# Patient Record
Sex: FEMALE | Race: WHITE | NOT HISPANIC OR LATINO | Employment: OTHER | URBAN - METROPOLITAN AREA
[De-identification: names, ages, dates, MRNs, and addresses within clinical notes are randomized per-mention and may not be internally consistent; named-entity substitution may affect disease eponyms.]

---

## 2017-07-14 ENCOUNTER — OFFICE VISIT (OUTPATIENT)
Dept: FAMILY MEDICINE CLINIC | Facility: CLINIC | Age: 47
End: 2017-07-14

## 2017-07-14 VITALS
BODY MASS INDEX: 34.2 KG/M2 | SYSTOLIC BLOOD PRESSURE: 120 MMHG | HEIGHT: 63 IN | HEART RATE: 70 BPM | WEIGHT: 193 LBS | DIASTOLIC BLOOD PRESSURE: 90 MMHG | TEMPERATURE: 98 F | OXYGEN SATURATION: 98 %

## 2017-07-14 DIAGNOSIS — F41.9 ANXIETY: ICD-10-CM

## 2017-07-14 DIAGNOSIS — G43.009 MIGRAINE WITHOUT AURA AND WITHOUT STATUS MIGRAINOSUS, NOT INTRACTABLE: ICD-10-CM

## 2017-07-14 DIAGNOSIS — Z00.00 ANNUAL PHYSICAL EXAM: Primary | ICD-10-CM

## 2017-07-14 PROBLEM — R06.81 BREATHLESSNESS ON EXERTION: Status: RESOLVED | Noted: 2017-07-14 | Resolved: 2017-07-14

## 2017-07-14 PROBLEM — J30.9 ALLERGIC RHINITIS: Status: ACTIVE | Noted: 2017-07-14

## 2017-07-14 PROBLEM — M19.90 ARTHRITIS: Status: RESOLVED | Noted: 2017-07-14 | Resolved: 2017-07-14

## 2017-07-14 PROBLEM — M19.90 ARTHRITIS: Status: ACTIVE | Noted: 2017-07-14

## 2017-07-14 PROBLEM — R07.9 CHEST PAIN: Status: RESOLVED | Noted: 2017-07-14 | Resolved: 2017-07-14

## 2017-07-14 PROBLEM — R07.9 CHEST PAIN: Status: ACTIVE | Noted: 2017-07-14

## 2017-07-14 PROBLEM — R06.81 BREATHLESSNESS ON EXERTION: Status: ACTIVE | Noted: 2017-07-14

## 2017-07-14 LAB
BILIRUB BLD-MCNC: NEGATIVE MG/DL
CLARITY, POC: CLEAR
COLOR UR: YELLOW
GLUCOSE UR STRIP-MCNC: NEGATIVE MG/DL
KETONES UR QL: NEGATIVE
LEUKOCYTE EST, POC: NEGATIVE
NITRITE UR-MCNC: NEGATIVE MG/ML
PH UR: 5.5 [PH] (ref 5–8)
PROT UR STRIP-MCNC: NEGATIVE MG/DL
RBC # UR STRIP: ABNORMAL /UL
SP GR UR: 1.03 (ref 1–1.03)
UROBILINOGEN UR QL: NORMAL

## 2017-07-14 PROCEDURE — 81003 URINALYSIS AUTO W/O SCOPE: CPT | Performed by: FAMILY MEDICINE

## 2017-07-14 PROCEDURE — 99396 PREV VISIT EST AGE 40-64: CPT | Performed by: FAMILY MEDICINE

## 2017-07-14 RX ORDER — BUPROPION HYDROCHLORIDE 150 MG/1
150 TABLET, EXTENDED RELEASE ORAL DAILY
Qty: 30 TABLET | Refills: 5 | Status: SHIPPED | OUTPATIENT
Start: 2017-07-14 | End: 2018-10-23

## 2017-07-14 RX ORDER — SUMATRIPTAN 100 MG/1
TABLET, FILM COATED ORAL
Qty: 6 TABLET | Refills: 5 | Status: SHIPPED | OUTPATIENT
Start: 2017-07-14 | End: 2018-07-31 | Stop reason: SINTOL

## 2017-07-14 NOTE — PROGRESS NOTES
Subjective   Janye Peña is a 46 y.o. female presenting with   Chief Complaint   Patient presents with   • Annual Exam   • Anxiety     would like a different kind of medication for anxiety         HPI Comments: This is a 46-year-old white female nonsmoker who has recently gone through a divorce and has moved and so she has been under a lot of stress.  She is here for annual exam and fasting lab, but also mentions that she has had increased anxiety lately and she also has had about 6 migraines in the last 7 months.  She has an oral that warrants her she is about to have one but she does not have any medication for this.  Normally she can take Excedrin Migraine when she feels one coming on, but the last one 2 weeks ago was fully developed when she woke up so she had to just wait for it to subside.  She does not have any stiff neck or confusion or focal neurologic deficit during these migraines.    She also says with the divorce her anxiety level is much higher when she takes the Atarax it causes her to be extremely sedated.  She would like to try a different medication for this.    She does not have any change in bowel movements but she does mention that her grandfather late in life was diagnosed with colon cancer.  Her mother has diabetes and has had a heart attack but does not have any colon problem at 63 years old.  I told her that she should go ahead and get a colonoscopy now because of her positive family history.    Anxiety   Symptoms include nervous/anxious behavior.            The following portions of the patient's history were reviewed and updated as appropriate: current medications, past family history, past medical history, past social history, past surgical history and problem list.    Review of Systems   Neurological: Positive for headaches.   Psychiatric/Behavioral: The patient is nervous/anxious.    All other systems reviewed and are negative.      Objective   Physical Exam   Constitutional: She is  oriented to person, place, and time. She appears well-developed and well-nourished. No distress.   HENT:   Head: Normocephalic and atraumatic.   Right Ear: External ear normal.   Left Ear: External ear normal.   Mouth/Throat: Oropharynx is clear and moist. No oropharyngeal exudate.   Eyes: Conjunctivae and EOM are normal. Pupils are equal, round, and reactive to light. No scleral icterus.   Neck: Normal range of motion. Neck supple. No thyromegaly present.   Cardiovascular: Normal rate, regular rhythm, normal heart sounds and intact distal pulses.  Exam reveals no friction rub.    No murmur heard.  Pulmonary/Chest: Effort normal and breath sounds normal. No respiratory distress. She has no wheezes.   Abdominal: Soft. Bowel sounds are normal. She exhibits no distension and no mass. There is tenderness (minor left lower quadrant tenderness without mass). There is no guarding.   Musculoskeletal: Normal range of motion. She exhibits no edema, tenderness or deformity.   Lymphadenopathy:     She has no cervical adenopathy.   Neurological: She is alert and oriented to person, place, and time. No cranial nerve deficit. Coordination normal.   Skin: Skin is warm and dry. No rash noted. She is not diaphoretic.   Psychiatric: She has a normal mood and affect. Her behavior is normal.   Nursing note and vitals reviewed.      Assessment/Plan   Janey was seen today for annual exam and anxiety.    Diagnoses and all orders for this visit:    Annual physical exam  -     Comprehensive Metabolic Panel  -     Lipid Panel With LDL / HDL Ratio  -     TSH  -     CBC & Differential  -     T4, Free  -     Ambulatory Referral to Gastroenterology    Anxiety    Migraine without aura and without status migrainosus, not intractable    Other orders  -     buPROPion SR (WELLBUTRIN SR) 150 MG 12 hr tablet; Take 1 tablet by mouth Daily.  -     SUMAtriptan (IMITREX) 100 MG tablet; Take one at the first sign of a migraine.  May repeat in 2 hours as  needed.                   I would like him to return for another visit in 6 month(s)

## 2017-07-14 NOTE — PATIENT INSTRUCTIONS
This is a very nice 46-year-old who is here for annual exam.  I will request blood work and notify her when the results are available.  I would like her to call if there is a problem.

## 2017-07-15 LAB
ALBUMIN SERPL-MCNC: 4.7 G/DL (ref 3.5–5.2)
ALBUMIN/GLOB SERPL: 2 G/DL
ALP SERPL-CCNC: 54 U/L (ref 39–117)
ALT SERPL-CCNC: 14 U/L (ref 1–33)
AST SERPL-CCNC: 15 U/L (ref 1–32)
BASOPHILS # BLD AUTO: 0.13 10*3/MM3 (ref 0–0.2)
BASOPHILS NFR BLD AUTO: 1.7 % (ref 0–1.5)
BILIRUB SERPL-MCNC: 0.7 MG/DL (ref 0.1–1.2)
BUN SERPL-MCNC: 10 MG/DL (ref 6–20)
BUN/CREAT SERPL: 11.4 (ref 7–25)
CALCIUM SERPL-MCNC: 9.5 MG/DL (ref 8.6–10.5)
CHLORIDE SERPL-SCNC: 104 MMOL/L (ref 98–107)
CHOLEST SERPL-MCNC: 182 MG/DL (ref 0–200)
CO2 SERPL-SCNC: 25.2 MMOL/L (ref 22–29)
CREAT SERPL-MCNC: 0.88 MG/DL (ref 0.57–1)
EOSINOPHIL # BLD AUTO: 0.15 10*3/MM3 (ref 0–0.7)
EOSINOPHIL NFR BLD AUTO: 1.9 % (ref 0.3–6.2)
ERYTHROCYTE [DISTWIDTH] IN BLOOD BY AUTOMATED COUNT: 13.1 % (ref 11.7–13)
GLOBULIN SER CALC-MCNC: 2.4 GM/DL
GLUCOSE SERPL-MCNC: 86 MG/DL (ref 65–99)
HCT VFR BLD AUTO: 45.3 % (ref 35.6–45.5)
HDLC SERPL-MCNC: 61 MG/DL (ref 40–60)
HGB BLD-MCNC: 14.6 G/DL (ref 11.9–15.5)
IMM GRANULOCYTES # BLD: 0 10*3/MM3 (ref 0–0.03)
IMM GRANULOCYTES NFR BLD: 0 % (ref 0–0.5)
LDLC SERPL CALC-MCNC: 108 MG/DL (ref 0–100)
LDLC/HDLC SERPL: 1.77 {RATIO}
LYMPHOCYTES # BLD AUTO: 2.08 10*3/MM3 (ref 0.9–4.8)
LYMPHOCYTES NFR BLD AUTO: 26.4 % (ref 19.6–45.3)
MCH RBC QN AUTO: 30.8 PG (ref 26.9–32)
MCHC RBC AUTO-ENTMCNC: 32.2 G/DL (ref 32.4–36.3)
MCV RBC AUTO: 95.6 FL (ref 80.5–98.2)
MONOCYTES # BLD AUTO: 0.6 10*3/MM3 (ref 0.2–1.2)
MONOCYTES NFR BLD AUTO: 7.6 % (ref 5–12)
NEUTROPHILS # BLD AUTO: 4.91 10*3/MM3 (ref 1.9–8.1)
NEUTROPHILS NFR BLD AUTO: 62.4 % (ref 42.7–76)
PLATELET # BLD AUTO: 276 10*3/MM3 (ref 140–500)
POTASSIUM SERPL-SCNC: 4.7 MMOL/L (ref 3.5–5.2)
PROT SERPL-MCNC: 7.1 G/DL (ref 6–8.5)
RBC # BLD AUTO: 4.74 10*6/MM3 (ref 3.9–5.2)
SODIUM SERPL-SCNC: 144 MMOL/L (ref 136–145)
T4 FREE SERPL-MCNC: 1.17 NG/DL (ref 0.93–1.7)
TRIGL SERPL-MCNC: 64 MG/DL (ref 0–150)
TSH SERPL DL<=0.005 MIU/L-ACNC: 2.06 MIU/ML (ref 0.27–4.2)
VLDLC SERPL CALC-MCNC: 12.8 MG/DL (ref 5–40)
WBC # BLD AUTO: 7.87 10*3/MM3 (ref 4.5–10.7)

## 2017-07-17 NOTE — PROGRESS NOTES
Please call the patient regarding her abnormal result. Tried to call pt her voice mail was not set up

## 2017-07-18 NOTE — PROGRESS NOTES
Please call the patient regarding her abnormal result.   Spoke to pt informed her of her results.  Per her request I mailed her a copy of her results

## 2018-07-31 ENCOUNTER — OFFICE VISIT (OUTPATIENT)
Dept: OBSTETRICS AND GYNECOLOGY | Facility: CLINIC | Age: 48
End: 2018-07-31

## 2018-07-31 VITALS
BODY MASS INDEX: 34.2 KG/M2 | DIASTOLIC BLOOD PRESSURE: 78 MMHG | SYSTOLIC BLOOD PRESSURE: 120 MMHG | WEIGHT: 193 LBS | HEIGHT: 63 IN

## 2018-07-31 DIAGNOSIS — Z01.419 WELL WOMAN EXAM WITH ROUTINE GYNECOLOGICAL EXAM: ICD-10-CM

## 2018-07-31 DIAGNOSIS — Z80.3 FAMILY HX-BREAST MALIGNANCY: ICD-10-CM

## 2018-07-31 DIAGNOSIS — N63.10 BREAST MASS, RIGHT: Primary | ICD-10-CM

## 2018-07-31 PROCEDURE — 99213 OFFICE O/P EST LOW 20 MIN: CPT | Performed by: NURSE PRACTITIONER

## 2018-07-31 PROCEDURE — 99386 PREV VISIT NEW AGE 40-64: CPT | Performed by: NURSE PRACTITIONER

## 2018-07-31 NOTE — PROGRESS NOTES
[facility]  Routine Annual Visit    2018    Patient: Janey Peña          MR#:[id number]      History of Present Illness    47 y.o. female  who presents to [facility] care, for annual exam, and with complaint of right breast lump. She is a former patient of Dr. Car- has not been seen since about  per pt. Hx hysterectomy w/o bso for endometriosis 2000 by Dr Almaraz. Last mammogram benign . She complains today of fluctuating right breast lump x 2 months. She reports frequent cysts in breast bilaterally, and more frequently in the right breast at area of concern. She reports the area grew rapidly in size and was tender over several weeks and has since regressed is almost non-palpable today per pt. She denies any obvious breast lumps in left breast. We discussed her mother's hx breast cancer in her early 50s. Offered genetic testing-pt plans to discuss with mother and encourage her testing first. No other complaints today. Denies any pertinent medical hx.    No LMP recorded. Patient has had a hysterectomy.  Obstetric History:  OB History      Para Term  AB Living    2 2 2          SAB TAB Ectopic Molar Multiple Live Births                        Menstrual History:     No LMP recorded. Patient has had a hysterectomy.       Sexual History:       ________________________________________  Patient Active Problem List   Diagnosis   • Anxiety   • Headache, migraine   • Cannot sleep   • Screening for breast cancer   • Allergic rhinitis due to cats   • Annual physical exam   • Allergic rhinitis       Past Medical History:   Diagnosis Date   • Anxiety    • Breast cyst    • Endometriosis    • Hyperlipidemia    • Hypertension    • Ovarian cyst        Past Surgical History:   Procedure Laterality Date   • BREAST BIOPSY     •  SECTION     • HYSTERECTOMY     • SHOULDER SURGERY         History   Smoking Status   • Never Smoker   Smokeless Tobacco   • Not on file        Family History   Problem Relation Age of Onset   • Breast cancer Mother 56   • Heart disease Mother    • Hyperlipidemia Mother    • Hypertension Mother    • Diabetes Mother    • Other Mother         BLOOD CLOTS   • Anxiety disorder Father    • Hypertension Sister        Prior to Admission medications    Medication Sig Start Date End Date Taking? Authorizing Provider   SUMAtriptan (IMITREX) 100 MG tablet Take one at the first sign of a migraine.  May repeat in 2 hours as needed. 7/14/17 7/31/18 Yes Festus Beltrán MD   buPROPion SR (WELLBUTRIN SR) 150 MG 12 hr tablet Take 1 tablet by mouth Daily. 7/14/17   Festus Beltrán MD   hydrOXYzine (ATARAX) 25 MG tablet Take 1 tablet by mouth every 8 (eight) hours as needed for itching. 5/24/16   KISHOR Huynh     ________________________________________      The following portions of the patient's history were reviewed and updated as appropriate: allergies, current medications, past family history, past medical history, past social history, past surgical history and problem list.    Review of Systems   Constitutional: Negative.    HENT: Negative.    Eyes: Negative for visual disturbance.   Respiratory: Negative for cough, shortness of breath and wheezing.    Cardiovascular: Negative for chest pain, palpitations and leg swelling.   Gastrointestinal: Negative for abdominal distention, abdominal pain, blood in stool, constipation, diarrhea, nausea and vomiting.   Endocrine: Negative for cold intolerance and heat intolerance.   Genitourinary: Negative for difficulty urinating, dyspareunia, dysuria, frequency, genital sores, hematuria, menstrual problem, pelvic pain, urgency, vaginal bleeding, vaginal discharge and vaginal pain.   Musculoskeletal: Negative.    Skin: Negative.    Neurological: Negative for dizziness, weakness, light-headedness, numbness and headaches.   Hematological: Negative.    Psychiatric/Behavioral: Negative.    Breasts: See  "HPI      Objective   Physical Exam   Pulmonary/Chest:           /78   Ht 160 cm (62.99\")   Wt 87.5 kg (193 lb)   BMI 34.20 kg/m²    BP Readings from Last 3 Encounters:   07/31/18 120/78   07/14/17 120/90   04/18/17 128/88      Wt Readings from Last 3 Encounters:   07/31/18 87.5 kg (193 lb)   07/14/17 87.5 kg (193 lb)   04/18/17 85.3 kg (188 lb)        BMI: Estimated body mass index is 34.2 kg/m² as calculated from the following:    Height as of this encounter: 160 cm (62.99\").    Weight as of this encounter: 87.5 kg (193 lb).      General:   alert, appears stated age and cooperative   Heart: regular rate and rhythm, S1, S2 normal, no murmur, click, rub or gallop   Lungs: clear to auscultation bilaterally   Abdomen: soft, non-tender, without masses or organomegaly   Breast: palpable fibroglandular nodularity without suspicious skin change, possible mobile, tender nodule noted at area of concern right breast 8:00 several cms from areola   Vulva: normal   Vagina: normal mucosa, normal discharge   Cervix: absent   Uterus: absent    Adnexa: no mass, fullness, tenderness     Assessment:    1. Normal annual exam   2. Right breast lump- pt due for bilateral screening- bilateral diagnostic mammogram with US prn ordered and appointment arranged for pt prior to leaving office. Highly encouraged yearly mammograms at minimum. Counseled regarding added surveillance, referral to breast surgeon, and genetic testing.  3. Healthy lifestyle modifications discussed, counseled on self breast exams and bone health      Plan:    []  Rx:   [x]  Mammogram request made  [x]  PAP done  []  Occult fecal blood test ([facility])  []  Labs:   []  GC/Chl/TV  []  DEXA scan   []  Referral for colonoscopy:           Rosalee Bauer, KISHOR  7/31/2018 9:19 AM  "

## 2018-08-01 ENCOUNTER — HOSPITAL ENCOUNTER (OUTPATIENT)
Dept: MAMMOGRAPHY | Facility: HOSPITAL | Age: 48
Discharge: HOME | End: 2018-08-01
Admitting: NURSE PRACTITIONER

## 2018-08-01 ENCOUNTER — HOSPITAL ENCOUNTER (OUTPATIENT)
Dept: ULTRASOUND IMAGING | Facility: HOSPITAL | Age: 48
Discharge: HOME | End: 2018-08-01

## 2018-08-01 DIAGNOSIS — Z80.3 FAMILY HX-BREAST MALIGNANCY: ICD-10-CM

## 2018-08-01 DIAGNOSIS — N63.10 BREAST MASS, RIGHT: ICD-10-CM

## 2018-08-01 PROCEDURE — 77066 DX MAMMO INCL CAD BI: CPT

## 2018-08-01 PROCEDURE — 76642 ULTRASOUND BREAST LIMITED: CPT

## 2018-08-03 ENCOUNTER — TELEPHONE (OUTPATIENT)
Dept: OBSTETRICS AND GYNECOLOGY | Facility: CLINIC | Age: 48
End: 2018-08-03

## 2018-08-03 DIAGNOSIS — Z80.3 FAMILY HISTORY OF BREAST CANCER IN MOTHER: ICD-10-CM

## 2018-08-03 DIAGNOSIS — N60.01 BREAST CYST, RIGHT: Primary | ICD-10-CM

## 2018-08-03 DIAGNOSIS — R92.8 ABNORMAL MAMMOGRAM: ICD-10-CM

## 2018-08-03 LAB
CYTOLOGIST CVX/VAG CYTO: ABNORMAL
CYTOLOGY CVX/VAG DOC THIN PREP: ABNORMAL
DX ICD CODE: ABNORMAL
HIV 1 & 2 AB SER-IMP: ABNORMAL
HPV I/H RISK 4 DNA CVX QL PROBE+SIG AMP: POSITIVE
OTHER STN SPEC: ABNORMAL
PATH REPORT.FINAL DX SPEC: ABNORMAL
STAT OF ADQ CVX/VAG CYTO-IMP: ABNORMAL

## 2018-08-31 ENCOUNTER — OFFICE VISIT (OUTPATIENT)
Dept: MAMMOGRAPHY | Facility: CLINIC | Age: 48
End: 2018-08-31

## 2018-08-31 VITALS
SYSTOLIC BLOOD PRESSURE: 110 MMHG | TEMPERATURE: 97.6 F | HEIGHT: 63 IN | BODY MASS INDEX: 29.95 KG/M2 | HEART RATE: 82 BPM | DIASTOLIC BLOOD PRESSURE: 75 MMHG | OXYGEN SATURATION: 97 % | WEIGHT: 169 LBS

## 2018-08-31 DIAGNOSIS — Z91.89 AT HIGH RISK FOR BREAST CANCER: Primary | ICD-10-CM

## 2018-08-31 DIAGNOSIS — N64.4 MASTODYNIA: ICD-10-CM

## 2018-08-31 PROCEDURE — 99203 OFFICE O/P NEW LOW 30 MIN: CPT | Performed by: SURGERY

## 2018-08-31 RX ORDER — HYDROCODONE BITARTRATE AND ACETAMINOPHEN 5; 325 MG/1; MG/1
1 TABLET ORAL EVERY 6 HOURS PRN
COMMUNITY
End: 2018-10-23

## 2018-08-31 RX ORDER — PHENTERMINE HYDROCHLORIDE 30 MG/1
30 CAPSULE ORAL EVERY MORNING
COMMUNITY
End: 2019-08-20

## 2018-09-09 ENCOUNTER — HOSPITAL ENCOUNTER (OUTPATIENT)
Dept: MRI IMAGING | Facility: HOSPITAL | Age: 48
Discharge: HOME | End: 2018-09-09
Attending: SURGERY

## 2018-09-11 ENCOUNTER — TELEPHONE (OUTPATIENT)
Dept: FAMILY MEDICINE CLINIC | Facility: CLINIC | Age: 48
End: 2018-09-11

## 2018-09-12 RX ORDER — ONDANSETRON 4 MG/1
4 TABLET, FILM COATED ORAL EVERY 8 HOURS PRN
Qty: 12 TABLET | Refills: 1 | Status: SHIPPED | OUTPATIENT
Start: 2018-09-12 | End: 2019-03-12

## 2018-09-12 RX ORDER — MECLIZINE HYDROCHLORIDE 25 MG/1
25 TABLET ORAL 3 TIMES DAILY PRN
Qty: 20 TABLET | Refills: 2 | Status: SHIPPED | OUTPATIENT
Start: 2018-09-12 | End: 2019-03-12

## 2018-09-14 ENCOUNTER — HOSPITAL ENCOUNTER (OUTPATIENT)
Dept: MRI IMAGING | Facility: HOSPITAL | Age: 48
Discharge: HOME | End: 2018-09-14
Attending: SURGERY | Admitting: SURGERY

## 2018-09-14 DIAGNOSIS — Z91.89 AT HIGH RISK FOR BREAST CANCER: ICD-10-CM

## 2018-09-14 DIAGNOSIS — N64.4 MASTODYNIA: ICD-10-CM

## 2018-09-14 PROCEDURE — C8908 MRI W/O FOL W/CONT, BREAST,: HCPCS

## 2018-09-14 PROCEDURE — A9577 INJ MULTIHANCE: HCPCS | Performed by: SURGERY

## 2018-09-14 PROCEDURE — 0159T HC MRI BREAST COMPUTER ANALYSIS: CPT

## 2018-09-14 PROCEDURE — 0 GADOBENATE DIMEGLUMINE 529 MG/ML SOLUTION: Performed by: SURGERY

## 2018-09-14 RX ADMIN — GADOBENATE DIMEGLUMINE 15 ML: 529 INJECTION, SOLUTION INTRAVENOUS at 18:35

## 2018-09-17 ENCOUNTER — TELEPHONE (OUTPATIENT)
Dept: MAMMOGRAPHY | Facility: CLINIC | Age: 48
End: 2018-09-17

## 2018-09-21 ENCOUNTER — TELEPHONE (OUTPATIENT)
Dept: MAMMOGRAPHY | Facility: CLINIC | Age: 48
End: 2018-09-21

## 2018-09-21 NOTE — TELEPHONE ENCOUNTER
I was able to speak with her today.  I told her she has a dominant cyst in the breast but otherwise no signs of malignancy.  She has an appointment with the medical oncologists but is still quite certain that she wants to proceed with bilateral mastectomies.

## 2018-10-01 ENCOUNTER — CONSULT (OUTPATIENT)
Dept: ONCOLOGY | Facility: CLINIC | Age: 48
End: 2018-10-01

## 2018-10-01 ENCOUNTER — LAB (OUTPATIENT)
Dept: OTHER | Facility: HOSPITAL | Age: 48
End: 2018-10-01

## 2018-10-01 VITALS
DIASTOLIC BLOOD PRESSURE: 86 MMHG | TEMPERATURE: 97.8 F | WEIGHT: 163.9 LBS | OXYGEN SATURATION: 100 % | HEART RATE: 68 BPM | RESPIRATION RATE: 16 BRPM | SYSTOLIC BLOOD PRESSURE: 122 MMHG | HEIGHT: 63 IN | BODY MASS INDEX: 29.04 KG/M2

## 2018-10-01 DIAGNOSIS — Z91.89 AT HIGH RISK FOR BREAST CANCER: Primary | ICD-10-CM

## 2018-10-01 LAB
BASOPHILS # BLD AUTO: 0.11 10*3/MM3 (ref 0–0.2)
BASOPHILS NFR BLD AUTO: 1 % (ref 0–1.5)
DEPRECATED RDW RBC AUTO: 40.1 FL (ref 37–54)
EOSINOPHIL # BLD AUTO: 0.16 10*3/MM3 (ref 0–0.7)
EOSINOPHIL NFR BLD AUTO: 1.5 % (ref 0.3–6.2)
ERYTHROCYTE [DISTWIDTH] IN BLOOD BY AUTOMATED COUNT: 12.5 % (ref 11.7–13)
HCT VFR BLD AUTO: 44.2 % (ref 35.6–45.5)
HGB BLD-MCNC: 14.9 G/DL (ref 11.9–15.5)
IMM GRANULOCYTES # BLD: 0.04 10*3/MM3 (ref 0–0.03)
IMM GRANULOCYTES NFR BLD: 0.4 % (ref 0–0.5)
LYMPHOCYTES # BLD AUTO: 2.54 10*3/MM3 (ref 0.9–4.8)
LYMPHOCYTES NFR BLD AUTO: 23.9 % (ref 19.6–45.3)
MCH RBC QN AUTO: 29.6 PG (ref 26.9–32)
MCHC RBC AUTO-ENTMCNC: 33.7 G/DL (ref 32.4–36.3)
MCV RBC AUTO: 87.7 FL (ref 80.5–98.2)
MONOCYTES # BLD AUTO: 0.73 10*3/MM3 (ref 0.2–1.2)
MONOCYTES NFR BLD AUTO: 6.9 % (ref 5–12)
NEUTROPHILS # BLD AUTO: 7.03 10*3/MM3 (ref 1.9–8.1)
NEUTROPHILS NFR BLD AUTO: 66.3 % (ref 42.7–76)
NRBC BLD MANUAL-RTO: 0 /100 WBC (ref 0–0)
PLATELET # BLD AUTO: 275 10*3/MM3 (ref 140–500)
PMV BLD AUTO: 10 FL (ref 6–12)
RBC # BLD AUTO: 5.04 10*6/MM3 (ref 3.9–5.2)
WBC NRBC COR # BLD: 10.61 10*3/MM3 (ref 4.5–10.7)

## 2018-10-01 PROCEDURE — 36415 COLL VENOUS BLD VENIPUNCTURE: CPT

## 2018-10-01 PROCEDURE — 99243 OFF/OP CNSLTJ NEW/EST LOW 30: CPT | Performed by: INTERNAL MEDICINE

## 2018-10-01 PROCEDURE — 85025 COMPLETE CBC W/AUTO DIFF WBC: CPT | Performed by: INTERNAL MEDICINE

## 2018-10-01 NOTE — PROGRESS NOTES
.     REASON FOR CONSULTATION: Increased risk for breast cancer.   Provide an opinion on any further workup or treatment                             REQUESTING PHYSICIAN: Lewis Love MD    RECORDS OBTAINED:  Records of the patients history including those obtained from the referring provider were reviewed and summarized in detail.    HISTORY OF PRESENT ILLNESS:  The patient is a 47 y.o. year old female who is here for an initial visit with the above-mentioned history.  The patient has a long-standing history of bilateral breast pain which she reports has been present since her late 20s.  Her mother had similar difficulties.  More recently, the breast pain has become a major issue affecting her quality of life on a daily basis.  Pain is usually cyclic, occurring intensely for 2-3 weeks per month.  She has attempted treatment with her gynecologist using vitamin E, evening primrose, topical progesterone cream following a two-year treatment with progesterone replacement (discontinued in 2015).  The patient reports that none of these maneuvers have helped.  She did see Dr. Love on 8/31/18 and discussed possibility of bilateral mastectomies.  Dr. Love assessed her breast cancer risk which was found to be elevated with Alberta model 2.2% 5 year risk (19% lifetime risk) and Tyrer Cuzick risk of 29.9%.    The patient had recent breast imaging with mammogram and ultrasound on 8/1/18 showing 1.2 cm probable fibroadenoma left 2 o'clock position, 0.5 cm probable benign complex cyst at 3 o'clock position left, dominant benign cyst 5.5 cm in the right breast at site pain and palpable concern with benign appearance and additional benign-appearing cysts in the bilateral upper outer quadrants, no findings suspicious for malignancy in the right breast.  BI-RADS 3, recommended 6 month follow-up ultrasound.  Bilateral breast MRI 9/14/18 with dominant right cysts 11:00 measuring 4.4 cm with no abnormal enhancement,  cluster of cysts 1.7 cm posterior one third upper outer quadrant left breast.  No findings suspicious for malignancy bilaterally, recommended routine follow-up imaging, BI-RADS 2.    The patient's obesity/GYN history is as follows: Menarche at age 10,  A0 with first live birth at age 15, last menstrual cycle in  with simple hysterectomy performed in .  One ovary removed in  with 1 ovary remaining.  Previous oral contraceptive use ×3 years with subsequent oral progesterone use for approximately 2 years (discontinued in ).  Right breast lumpectomy in  with benign findings (report not available).  Family history with mother with breast cancer at age 50, paternal grandmother with breast cancer at age 60 (and subsequent lung cancer), maternal great aunt with breast cancer at unknown age.  Additional family history of malignancy with paternal uncle with bladder cancer.    The patient today reports that she continues with intense bilateral breast pain, cyclic as noted above worse for 2-3 weeks per month.  Pain limits her daily level of functioning.  Patient has significant hot flashes at baseline which includes night sweats.  She has intermittent chronic vertigo which was present recently but is now again resolved, previous vestibular training.    Past Medical History:   Diagnosis Date   • Anxiety    • At high risk for breast cancer    • Breast cyst    • Endometriosis    • History of prior pregnancies     x2   • Hyperlipidemia    • Hypertension    • Mastodynia    • Ovarian cyst      Past Surgical History:   Procedure Laterality Date   • BREAST BIOPSY Right     Benign   •  SECTION     • HYSTERECTOMY     • SHOULDER SURGERY         MEDICATIONS    Current Outpatient Prescriptions:   •  Biotin w/ Vitamins C & E (HAIR/SKIN/NAILS PO), Take  by mouth., Disp: , Rfl:   •  Cyanocobalamin (VITAMIN B 12 PO), Take  by mouth., Disp: , Rfl:   •  Ibuprofen (ADVIL PO), Take  by mouth., Disp: , Rfl:   •   phentermine 30 MG capsule, Take 30 mg by mouth Every Morning., Disp: , Rfl:   •  Vit B12-Methionine-Inos-Chol solution, Inject  into the appropriate muscle as directed by prescriber., Disp: , Rfl:   •  AMOXICILLIN PO, Take  by mouth., Disp: , Rfl:   •  buPROPion SR (WELLBUTRIN SR) 150 MG 12 hr tablet, Take 1 tablet by mouth Daily., Disp: 30 tablet, Rfl: 5  •  HYDROcodone-acetaminophen (NORCO) 5-325 MG per tablet, Take 1 tablet by mouth Every 6 (Six) Hours As Needed., Disp: , Rfl:   •  hydrOXYzine (ATARAX) 25 MG tablet, Take 1 tablet by mouth every 8 (eight) hours as needed for itching., Disp: 30 tablet, Rfl: 2  •  meclizine (ANTIVERT) 25 MG tablet, Take 1 tablet by mouth 3 (Three) Times a Day As Needed for dizziness., Disp: 20 tablet, Rfl: 2  •  ondansetron (ZOFRAN) 4 MG tablet, Take 1 tablet by mouth Every 8 (Eight) Hours As Needed for Nausea or Vomiting., Disp: 12 tablet, Rfl: 1    ALLERGIES:     Allergies   Allergen Reactions   • No Known Drug Allergy        SOCIAL HISTORY:       Social History     Social History   • Marital status:      Spouse name: N/A   • Number of children: 2   • Years of education: N/A     Occupational History   •  Self-Employed     Social History Main Topics   • Smoking status: Never Smoker   • Smokeless tobacco: Never Used   • Alcohol use 4.2 oz/week     7 Glasses of wine per week   • Drug use: No   • Sexual activity: Defer      Comment:      Other Topics Concern   • Not on file     Social History Narrative   • No narrative on file         FAMILY HISTORY:  Family History   Problem Relation Age of Onset   • Breast cancer Mother 56   • Heart disease Mother    • Hyperlipidemia Mother    • Hypertension Mother    • Diabetes Mother    • Heart attack Mother    • Hypothyroidism Mother    • Anxiety disorder Father    • Depression Father    • Hearing loss Father    • Hypertension Sister    • Asthma Sister    • COPD Sister    • Depression Sister    • Hyperlipidemia Sister    •  "Hypothyroidism Sister    • Cancer Paternal Uncle 70        bladder   • Breast cancer Paternal Grandmother 60   • Lung cancer Paternal Grandmother        REVIEW OF SYSTEMS:  A comprehensive review of systems was performed and was negative except as mentioned above in the HPI.         Vitals:    10/01/18 1550   BP: 122/86   Pulse: 68   Resp: 16   Temp: 97.8 °F (36.6 °C)   TempSrc: Oral   SpO2: 100%   Weight: 74.3 kg (163 lb 14.4 oz)   Height: 161 cm (63.39\")  Comment: new ht   PainSc: 0-No pain     No flowsheet data found.    Physical Exam   Constitutional: She is oriented to person, place, and time. She appears well-developed and well-nourished.   HENT:   Head: Normocephalic.   Eyes: Conjunctivae and EOM are normal.   Neurological: She is alert and oriented to person, place, and time.   Skin: No rash noted.   Psychiatric: She has a normal mood and affect. Her behavior is normal.       RECENT LABS:        WBC   Date Value Ref Range Status   10/01/2018 10.61 4.50 - 10.70 10*3/mm3 Final     Hemoglobin   Date Value Ref Range Status   10/01/2018 14.9 11.9 - 15.5 g/dL Final     Platelets   Date Value Ref Range Status   10/01/2018 275 140 - 500 10*3/mm3 Final       Assessment/Plan      1. Increased risk for breast cancer:  · The patient has undergone prior breast biopsy with lumpectomy in  on the right with benign findings.  · Menarche at age 10,  A0 with first live birth at age 15  · Last menstrual cycle in  with simple hysterectomy performed in .  One ovary removed in  with 1 ovary remaining.    · Previous oral contraceptive use ×3 years with subsequent oral progesterone use for approximately 2 years (discontinued in ).   · Family history with mother with breast cancer at age 50, paternal grandmother with breast cancer at age 60 (and subsequent lung cancer), maternal great aunt with breast cancer at unknown age.  Additional family history of malignancy with paternal uncle with bladder " cancer.  · Recent breast imaging with mammogram and ultrasound on 8/1/18 showing 1.2 cm probable fibroadenoma left 2 o'clock position, 0.5 cm probable benign complex cyst at 3 o'clock position left, dominant benign cyst 5.5 cm in the right breast at site pain and palpable concern with benign appearance and additional benign-appearing cysts in the bilateral upper outer quadrants, no findings suspicious for malignancy in the right breast.  BI-RADS 3, recommended 6 month follow-up ultrasound.    · Bilateral breast MRI 9/14/18 with dominant right cysts 11:00 measuring 4.4 cm with no abnormal enhancement, cluster of cysts 1.7 cm posterior one third upper outer quadrant left breast.  No findings suspicious for malignancy bilaterally, recommended routine follow-up imaging, BI-RADS 2.  · The patient was seen by Dr. Love on 8/31/18 and discussed possibility of bilateral mastectomies.  Dr. Love assessed her breast cancer risk which was found to be elevated with Alberta model 2.2% 5 year risk (19% lifetime risk) and Tyrer zick risk of 29.9%.  · I did discuss with the patient today the potential use chemotherapy prevention due to increased risk for breast cancer.  Since she has over age 33 with a greater than or equal to 1.7% risk by the Alberta model, she would be considered a potential candidate.  We did discuss improved risk with use of tamoxifen x 5 years to decrease the risk by 7 cases per 1000 or approximately 30-40% relative risk reduction.  We discussed side effects of tamoxifen including risk of endometrial cancer which does not apply patient since she is status post hysterectomy.  We discussed the risk of thrombosis.  We discussed the risk of hot flashes.  She is already having fairly intense hot flashes currently and is not interested in pursuing pharmacologic treatment.  · The patient is considering pursuing bilateral mastectomies, not necessarily for risk reduction however more for relief of her chronic breast  pain.  We did discuss that this would provide some reduction in her risk for breast cancer as well but is not a recommended risk reduction procedure specifically unless she were BRCA positive and she understands this.  · The patient would like to be seen in the genetics clinic to consider possible genetic testing and we will make a referral for her.  Apparently her mother attempted testing that was told she was not a candidate.  2. Chronic severe bilateral breast pain:  · This has been present for many years, likely related to fibrocystic disease, unresponsive to vitamin E, evening primrose, progesterone treatment as well as topical progesterone cream.  · Patient is considering pursuing bilateral mastectomies with Dr. Love for symptomatic relief due to severe effect on quality of life.  She realizes the potential risks from surgery.  She will follow-up with Dr. Love regarding this issue.    Plan:  1. Referral to genetics clinic  2. The patient will follow-up with Dr. Love to further discuss possible bilateral mastectomies due to chronic severe bilateral breast pain  3. We will not schedule further follow-up in our office unless the patient changes her mind regarding potential chemoprevention with tamoxifen.

## 2018-10-23 ENCOUNTER — OFFICE VISIT (OUTPATIENT)
Dept: MAMMOGRAPHY | Facility: CLINIC | Age: 48
End: 2018-10-23

## 2018-10-23 VITALS
DIASTOLIC BLOOD PRESSURE: 68 MMHG | TEMPERATURE: 98.7 F | OXYGEN SATURATION: 97 % | SYSTOLIC BLOOD PRESSURE: 114 MMHG | HEART RATE: 105 BPM

## 2018-10-23 DIAGNOSIS — Z91.89 AT HIGH RISK FOR BREAST CANCER: Primary | ICD-10-CM

## 2018-10-23 DIAGNOSIS — N64.4 MASTODYNIA: ICD-10-CM

## 2018-10-23 PROCEDURE — 99214 OFFICE O/P EST MOD 30 MIN: CPT | Performed by: SURGERY

## 2018-10-23 NOTE — PROGRESS NOTES
Subjective   Janey Peña is a 47 y.o. female     History of Present Illness the patient returns today to discuss her breast pain and treatment options for her high risk of breast cancer.  She has seen the medical oncologists who did recommend chemoprevention with hormone blocking agents.  Unfortunately the patient has pretty severe night sweats and the hormone blocking agents will make these worse which is unacceptable to the patient.  The patient also has  bilateral breast pain which is significantly interfering with her quality of life.  She has tried all the usual measures without any success at all.  Her imaging studies are up-to-date and reveal basically fibrocystic change only.  She has had a breast MRI which did not show any suspicious lesions.        Review of Systems there are no changes to the skin of the breast.  The rest of the review of systems is negative.  Past Medical History:   Diagnosis Date   • Anxiety    • At high risk for breast cancer    • Breast cyst    • Endometriosis    • History of prior pregnancies     x2   • Hyperlipidemia    • Hypertension    • Mastodynia    • Ovarian cyst      Past Surgical History:   Procedure Laterality Date   • BREAST BIOPSY Right     Benign   •  SECTION     • HYSTERECTOMY     • SHOULDER SURGERY       Family History   Problem Relation Age of Onset   • Breast cancer Mother 56   • Heart disease Mother    • Hyperlipidemia Mother    • Hypertension Mother    • Diabetes Mother    • Heart attack Mother    • Hypothyroidism Mother    • Anxiety disorder Father    • Depression Father    • Hearing loss Father    • Hypertension Sister    • Asthma Sister    • COPD Sister    • Depression Sister    • Hyperlipidemia Sister    • Hypothyroidism Sister    • Cancer Paternal Uncle 70        bladder   • Breast cancer Paternal Grandmother 60   • Lung cancer Paternal Grandmother      Social History     Social History   • Marital status:      Spouse name: N/A   • Number  of children: 2   • Years of education: N/A     Occupational History   •  Self-Employed     Social History Main Topics   • Smoking status: Never Smoker   • Smokeless tobacco: Never Used   • Alcohol use 4.2 oz/week     7 Glasses of wine per week   • Drug use: No   • Sexual activity: Defer      Comment:      Other Topics Concern   • Not on file     Social History Narrative   • No narrative on file       Objective   Physical Exam    Assessment/Plan  the office visit today lasted 30 minutes with the entire time spent in counseling.  Today we had a nice discussion regarding treatment options for her.  She does understand that surgical risk reduction utilizing bilateral mastectomies does not reduce her risk to 0.  There is a 95% reduction in risk.  In regards to her breast pain, I would expect the mastectomies to alleviate that but there is always some chance she could develop postoperative discomfort from the procedure itself.  The patient is interested in reconstruction and we talked about the various options which very from placement of tissue expanders to bringing in new tissue such as a TRAM or a latissimus flap.  We also talked about the difference between a skin sparing mastectomy and a nipple sparing mastectomy.  I do think the plastic surgeons could help us as to whether she is a candidate for these options.  Our plans are to send her to the plastic surgeons and once we have a specific procedure picked out we can attempt to precertified that.      The primary encounter diagnosis was At high risk for breast cancer. A diagnosis of Mastodynia was also pertinent to this visit.

## 2018-10-25 DIAGNOSIS — Z91.89 AT HIGH RISK FOR BREAST CANCER: Primary | ICD-10-CM

## 2018-11-27 ENCOUNTER — TELEPHONE (OUTPATIENT)
Dept: OBSTETRICS AND GYNECOLOGY | Facility: CLINIC | Age: 48
End: 2018-11-27

## 2018-11-27 ENCOUNTER — CLINICAL SUPPORT (OUTPATIENT)
Dept: OTHER | Facility: HOSPITAL | Age: 48
End: 2018-11-27
Attending: INTERNAL MEDICINE

## 2018-11-27 DIAGNOSIS — Z91.89 AT HIGH RISK FOR BREAST CANCER: Primary | ICD-10-CM

## 2018-11-27 DIAGNOSIS — Z80.3 FAMILY HISTORY OF BREAST CANCER: ICD-10-CM

## 2018-11-27 PROCEDURE — G0463 HOSPITAL OUTPT CLINIC VISIT: HCPCS

## 2018-11-27 NOTE — PATIENT INSTRUCTIONS
Pt seen by Dr. Toscano for genetic counseling and testing. Lab drawn with 21g butterfly needle in Left AC x 1 attempt. Sent to [facility] lab for STAT panel. Pt informed she would receive a phone call when test results.

## 2018-11-27 NOTE — TELEPHONE ENCOUNTER
Attempted to reach pt unable to leave  due to phone continuously ringing.   ----- Message from KISHOR Orr sent at 11/27/2018 10:31 AM EST -----  Please inform pt her PAP test was negative for any precancerous cell changes. A test for HPV (human papilloma virus) was also ran. This returned positive. This is a very common virus that nearly all sexually active women get at some point in their lives. It can cause cancer of the cervix or more commonly precancerous changes. It takes years for these changes to occur. In most cases, the immune system clears the virus from the genital tract in 12-24 months.     Recommend repeat pap smear 6 months. Thank you.

## 2018-11-28 ENCOUNTER — TELEPHONE (OUTPATIENT)
Dept: OBSTETRICS AND GYNECOLOGY | Facility: CLINIC | Age: 48
End: 2018-11-28

## 2018-11-28 NOTE — TELEPHONE ENCOUNTER
Attempted to reach pt again about results still unable to lvm.   ----- Message from KISHOR Orr sent at 11/27/2018 10:31 AM EST -----  Please inform pt her PAP test was negative for any precancerous cell changes. A test for HPV (human papilloma virus) was also ran. This returned positive. This is a very common virus that nearly all sexually active women get at some point in their lives. It can cause cancer of the cervix or more commonly precancerous changes. It takes years for these changes to occur. In most cases, the immune system clears the virus from the genital tract in 12-24 months.     Recommend repeat pap smear 6 months. Thank you.

## 2018-12-11 ENCOUNTER — TELEPHONE (OUTPATIENT)
Dept: OBSTETRICS AND GYNECOLOGY | Facility: CLINIC | Age: 48
End: 2018-12-11

## 2018-12-11 NOTE — TELEPHONE ENCOUNTER
Attempted to reach patient again and was unable too. Will send letter.   ----- Message from KISHOR Orr sent at 11/27/2018 10:31 AM EST -----  Please inform pt her PAP test was negative for any precancerous cell changes. A test for HPV (human papilloma virus) was also ran. This returned positive. This is a very common virus that nearly all sexually active women get at some point in their lives. It can cause cancer of the cervix or more commonly precancerous changes. It takes years for these changes to occur. In most cases, the immune system clears the virus from the genital tract in 12-24 months.     Recommend repeat pap smear 6 months. Thank you.

## 2019-01-18 ENCOUNTER — TELEPHONE (OUTPATIENT)
Dept: OBSTETRICS AND GYNECOLOGY | Facility: CLINIC | Age: 49
End: 2019-01-18

## 2019-01-22 DIAGNOSIS — N60.09 CYST OF BREAST, UNSPECIFIED LATERALITY: Primary | ICD-10-CM

## 2019-01-23 ENCOUNTER — TELEPHONE (OUTPATIENT)
Dept: OBSTETRICS AND GYNECOLOGY | Age: 49
End: 2019-01-23

## 2019-01-23 NOTE — TELEPHONE ENCOUNTER
UNABLE TO REACH PT. MAILED PT CARD TO CALL THE OFFICE AND LM FOR HER DAUGHTER TO CALL OR HAVE PT CALL AS SHE HAS A DISCONNECTED PHONE #. UNABLE TO SCHEDULE BREAST U/S AT THIS TIME UNTIL PT CONTACTS US.  ALEXANDER

## 2019-02-20 ENCOUNTER — OFFICE VISIT (OUTPATIENT)
Dept: FAMILY MEDICINE CLINIC | Facility: CLINIC | Age: 49
End: 2019-02-20

## 2019-02-20 VITALS
SYSTOLIC BLOOD PRESSURE: 130 MMHG | OXYGEN SATURATION: 100 % | HEART RATE: 76 BPM | RESPIRATION RATE: 18 BRPM | DIASTOLIC BLOOD PRESSURE: 72 MMHG | BODY MASS INDEX: 29.77 KG/M2 | TEMPERATURE: 98.2 F | WEIGHT: 168 LBS | HEIGHT: 63 IN

## 2019-02-20 DIAGNOSIS — G47.09 OTHER INSOMNIA: ICD-10-CM

## 2019-02-20 DIAGNOSIS — F41.9 ANXIETY: ICD-10-CM

## 2019-02-20 DIAGNOSIS — G43.019 INTRACTABLE MIGRAINE WITHOUT AURA AND WITHOUT STATUS MIGRAINOSUS: ICD-10-CM

## 2019-02-20 DIAGNOSIS — Z00.00 ROUTINE GENERAL MEDICAL EXAMINATION AT A HEALTH CARE FACILITY: Primary | ICD-10-CM

## 2019-02-20 PROCEDURE — 99213 OFFICE O/P EST LOW 20 MIN: CPT | Performed by: NURSE PRACTITIONER

## 2019-02-20 RX ORDER — ESCITALOPRAM OXALATE 10 MG/1
10 TABLET ORAL DAILY
Qty: 30 TABLET | Refills: 0 | Status: SHIPPED | OUTPATIENT
Start: 2019-02-20 | End: 2019-03-01 | Stop reason: DRUGHIGH

## 2019-02-20 NOTE — PATIENT INSTRUCTIONS
Living With Anxiety  After being diagnosed with an anxiety disorder, you may be relieved to know why you have felt or behaved a certain way. It is natural to also feel overwhelmed about the treatment ahead and what it will mean for your life. With care and support, you can manage this condition and recover from it.  How to cope with anxiety  Dealing with stress  Stress is your body’s reaction to life changes and events, both good and bad. Stress can last just a few hours or it can be ongoing. Stress can play a major role in anxiety, so it is important to learn both how to cope with stress and how to think about it differently.  Talk with your health care provider or a counselor to learn more about stress reduction. He or she may suggest some stress reduction techniques, such as:  · Music therapy. This can include creating or listening to music that you enjoy and that inspires you.  · Mindfulness-based meditation. This involves being aware of your normal breaths, rather than trying to control your breathing. It can be done while sitting or walking.  · Centering prayer. This is a kind of meditation that involves focusing on a word, phrase, or sacred image that is meaningful to you and that brings you peace.  · Deep breathing. To do this, expand your stomach and inhale slowly through your nose. Hold your breath for 3-5 seconds. Then exhale slowly, allowing your stomach muscles to relax.  · Self-talk. This is a skill where you identify thought patterns that lead to anxiety reactions and correct those thoughts.  · Muscle relaxation. This involves tensing muscles then relaxing them.    Choose a stress reduction technique that fits your lifestyle and personality. Stress reduction techniques take time and practice. Set aside 5-15 minutes a day to do them. Therapists can offer training in these techniques. The training may be covered by some insurance plans. Other things you can do to manage stress include:  · Keeping a  stress diary. This can help you learn what triggers your stress and ways to control your response.  · Thinking about how you respond to certain situations. You may not be able to control everything, but you can control your reaction.  · Making time for activities that help you relax, and not feeling guilty about spending your time in this way.    Therapy combined with coping and stress-reduction skills provides the best chance for successful treatment.  Medicines  Medicines can help ease symptoms. Medicines for anxiety include:  · Anti-anxiety drugs.  · Antidepressants.  · Beta-blockers.    Medicines may be used as the main treatment for anxiety disorder, along with therapy, or if other treatments are not working. Medicines should be prescribed by a health care provider.  Relationships  Relationships can play a big part in helping you recover. Try to spend more time connecting with trusted friends and family members. Consider going to couples counseling, taking family education classes, or going to family therapy. Therapy can help you and others better understand the condition.  How to recognize changes in your condition  Everyone has a different response to treatment for anxiety. Recovery from anxiety happens when symptoms decrease and stop interfering with your daily activities at home or work. This may mean that you will start to:  · Have better concentration and focus.  · Sleep better.  · Be less irritable.  · Have more energy.  · Have improved memory.    It is important to recognize when your condition is getting worse. Contact your health care provider if your symptoms interfere with home or work and you do not feel like your condition is improving.  Where to find help and support:  You can get help and support from these sources:  · Self-help groups.  · Online and community organizations.  · A trusted spiritual leader.  · Couples counseling.  · Family education classes.  · Family therapy.    Follow these  instructions at home:  · Eat a healthy diet that includes plenty of vegetables, fruits, whole grains, low-fat dairy products, and lean protein. Do not eat a lot of foods that are high in solid fats, added sugars, or salt.  · Exercise. Most adults should do the following:  ? Exercise for at least 150 minutes each week. The exercise should increase your heart rate and make you sweat (moderate-intensity exercise).  ? Strengthening exercises at least twice a week.  · Cut down on caffeine, tobacco, alcohol, and other potentially harmful substances.  · Get the right amount and quality of sleep. Most adults need 7-9 hours of sleep each night.  · Make choices that simplify your life.  · Take over-the-counter and prescription medicines only as told by your health care provider.  · Avoid caffeine, alcohol, and certain over-the-counter cold medicines. These may make you feel worse. Ask your pharmacist which medicines to avoid.  · Keep all follow-up visits as told by your health care provider. This is important.  Questions to ask your health care provider  · Would I benefit from therapy?  · How often should I follow up with a health care provider?  · How long do I need to take medicine?  · Are there any long-term side effects of my medicine?  · Are there any alternatives to taking medicine?  Contact a health care provider if:  · You have a hard time staying focused or finishing daily tasks.  · You spend many hours a day feeling worried about everyday life.  · You become exhausted by worry.  · You start to have headaches, feel tense, or have nausea.  · You urinate more than normal.  · You have diarrhea.  Get help right away if:  · You have a racing heart and shortness of breath.  · You have thoughts of hurting yourself or others.  If you ever feel like you may hurt yourself or others, or have thoughts about taking your own life, get help right away. You can go to your nearest emergency department or call:  · Your local emergency  services (911 in the [place]  · A suicide crisis helpline, such as the National Suicide Prevention Lifeline at 1-139.647.1104. This is open 24-hours a day.    Summary  · Taking steps to deal with stress can help calm you.  · Medicines cannot cure anxiety disorders, but they can help ease symptoms.  · Family, friends, and partners can play a big part in helping you recover from an anxiety disorder.  This information is not intended to replace advice given to you by your health care provider. Make sure you discuss any questions you have with your health care provider.  Document Released: 12/12/2017 Document Revised: 12/12/2017 Document Reviewed: 12/12/2017  Elsevier Interactive Patient Education © 2018 [employer]

## 2019-02-20 NOTE — PROGRESS NOTES
Subjective   Janey Peña is a 48 y.o. female.     Chief Complaint   Patient presents with   • Annual Exam      HPI patient is new to me.  She is here to establish care with me as her primary care provider in this office is retiring.  She considers herself overall healthy, exercises at the gym regularly, and tries to eat healthy but does struggle with weight loss.  She has been on phentermine for weight loss center.  She is now just taking phentermine twice per week.  She has a great deal of anxiety and has gone through a lot of life changes recently had a job change and a divorce recently but has always had underlying anxiety.  She has been on Zoloft and Wellbutrin in the past but she does not think that helped with her anxiety.  She thinks she has also been on other medications specifically for anxiety as well as sleep however she does not remember what she is taken and does not think that anything worked well.  She also has chronic insomnia she is able to fall asleep easily but usually wakes up after about 2 hours of sleep and cannot go back to sleep.  She usually gets up and does some housework, she feels like she needs to move around and recently she has noticed that her legs are getting restless.  She tries to fall asleep before she gets restless legs but does not think that restless legs are what cause her insomnia.  She is taken hydroxyzine in the past for anxiety but it makes her too sleepy and so she cannot tolerate a whole 25 mg tablet.  She breaks it in half to use as needed for anxiety however it still makes her very sleepy and she does not take it often.  She does have a family history of chronic insomnia as well.    She is monogamous with new fiancé.  She does not think she needs STI testing today.  She has a history of hysterectomy and single oophorectomy.    She denies tobacco, drinks an occasional glass of wine, and denies any recreational drug use.    Social History     Tobacco Use   • Smoking  "status: Never Smoker   • Smokeless tobacco: Never Used   Substance Use Topics   • Alcohol use: Yes     Alcohol/week: 4.2 oz     Types: 7 Glasses of wine per week   • Drug use: No       The following portions of the patient's history were reviewed and updated as appropriate: allergies, current medications, past family history, past medical history, past social history, past surgical history and problem list.    Review of Systems   Constitutional: Negative for activity change, appetite change, fatigue and unexpected weight change.   HENT: Negative for ear pain, sore throat and trouble swallowing.    Eyes: Negative for pain and visual disturbance.   Respiratory: Negative for cough and shortness of breath.    Cardiovascular: Negative for chest pain and palpitations.   Gastrointestinal: Negative for abdominal pain, blood in stool, constipation, diarrhea, nausea and vomiting.   Genitourinary: Negative for difficulty urinating, frequency and hematuria.   Musculoskeletal: Negative for arthralgias, joint swelling and myalgias.   Skin: Negative for rash.   Allergic/Immunologic: Negative for environmental allergies (Is allergic to cat dander).   Neurological: Positive for headaches (Does have migraines which are controlled with OTC meds, was not able to tolerate Imitrex). Negative for dizziness, seizures and weakness.   Hematological: Does not bruise/bleed easily.   Psychiatric/Behavioral: Positive for sleep disturbance. Negative for dysphoric mood and suicidal ideas. The patient is nervous/anxious.        Objective   Blood pressure 130/72, pulse 76, temperature 98.2 °F (36.8 °C), resp. rate 18, height 161 cm (63.39\"), weight 76.2 kg (168 lb), SpO2 100 %, not currently breastfeeding.    Physical Exam   Constitutional: She is oriented to person, place, and time. She appears well-developed and well-nourished. No distress.   HENT:   Head: Normocephalic and atraumatic.   Right Ear: Tympanic membrane, external ear and ear canal " normal.   Left Ear: Tympanic membrane, external ear and ear canal normal.   Nose: Nose normal.   Mouth/Throat: Uvula is midline and oropharynx is clear and moist. No oropharyngeal exudate.   Eyes: Conjunctivae and EOM are normal. Pupils are equal, round, and reactive to light. Right eye exhibits no discharge. Left eye exhibits no discharge.   Neck: Neck supple. No thyromegaly present.   Cardiovascular: Normal rate, regular rhythm, normal heart sounds and intact distal pulses.   No murmur heard.  Pulmonary/Chest: Effort normal and breath sounds normal.   Abdominal: Soft. Bowel sounds are normal. She exhibits no mass. There is no tenderness.   Musculoskeletal: She exhibits no deformity.   Gait steady and smooth   Lymphadenopathy:     She has no cervical adenopathy.   Neurological: She is alert and oriented to person, place, and time. No cranial nerve deficit.   Skin: Skin is warm and dry.   Psychiatric: Her speech is normal and behavior is normal. Judgment and thought content normal. Her mood appears anxious. Cognition and memory are normal.   At times loses focus on conversation and gets off topic however she is able to be reached easily refocused   Nursing note and vitals reviewed.      Assessment   Problem List Items Addressed This Visit        Cardiovascular and Mediastinum    Headache, migraine    Relevant Medications    escitalopram (LEXAPRO) 10 MG tablet       Other    Anxiety    Relevant Medications    escitalopram (LEXAPRO) 10 MG tablet    Other Relevant Orders    CBC and Differential    TSH Rfx On Abnormal To Free T4    Cannot sleep    Relevant Orders    Ambulatory Referral to Sleep Medicine      Other Visit Diagnoses     Routine general medical examination at a health care facility    -  Primary    Relevant Orders    Comprehensive metabolic panel    Lipid Panel With LDL/HDL Ratio    CBC and Differential    TSH Rfx On Abnormal To Free T4           Procedures PHQ-9 Total Score: 9  SANIA 7 Total Score: 16            Impression and Plan:  Lexapro for anxiety.  Referral for sleep.  Labs ordered. F/U in 6 weeks to see how lexapro is working.      Health Maintenance Due   Topic Date Due   • TDAP/TD VACCINES (1 - Tdap) 11/19/1987   • COLONOSCOPY  04/18/2017   • LIPID PANEL  07/14/2018   • ANNUAL PHYSICAL  07/15/2018   • ZOSTER VACCINE (1 of 2) 11/19/2018

## 2019-02-21 LAB
ALBUMIN SERPL-MCNC: 4.4 G/DL (ref 3.5–5.5)
ALBUMIN/GLOB SERPL: 1.8 {RATIO} (ref 1.2–2.2)
ALP SERPL-CCNC: 55 IU/L (ref 39–117)
ALT SERPL-CCNC: 12 IU/L (ref 0–32)
AST SERPL-CCNC: 15 IU/L (ref 0–40)
BASOPHILS # BLD AUTO: 0.1 X10E3/UL (ref 0–0.2)
BASOPHILS NFR BLD AUTO: 1 %
BILIRUB SERPL-MCNC: 0.4 MG/DL (ref 0–1.2)
BUN SERPL-MCNC: 11 MG/DL (ref 6–24)
BUN/CREAT SERPL: 13 (ref 9–23)
CALCIUM SERPL-MCNC: 9.9 MG/DL (ref 8.7–10.2)
CHLORIDE SERPL-SCNC: 103 MMOL/L (ref 96–106)
CHOLEST SERPL-MCNC: 177 MG/DL (ref 100–199)
CO2 SERPL-SCNC: 23 MMOL/L (ref 20–29)
CREAT SERPL-MCNC: 0.83 MG/DL (ref 0.57–1)
EOSINOPHIL # BLD AUTO: 0.1 X10E3/UL (ref 0–0.4)
EOSINOPHIL NFR BLD AUTO: 1 %
ERYTHROCYTE [DISTWIDTH] IN BLOOD BY AUTOMATED COUNT: 13.2 % (ref 12.3–15.4)
GLOBULIN SER CALC-MCNC: 2.4 G/DL (ref 1.5–4.5)
GLUCOSE SERPL-MCNC: 91 MG/DL (ref 65–99)
HCT VFR BLD AUTO: 42.1 % (ref 34–46.6)
HDLC SERPL-MCNC: 62 MG/DL
HGB BLD-MCNC: 13.8 G/DL (ref 11.1–15.9)
IMM GRANULOCYTES # BLD AUTO: 0 X10E3/UL (ref 0–0.1)
IMM GRANULOCYTES NFR BLD AUTO: 0 %
LDLC SERPL CALC-MCNC: 102 MG/DL (ref 0–99)
LDLC/HDLC SERPL: 1.6 RATIO (ref 0–3.2)
LYMPHOCYTES # BLD AUTO: 2.3 X10E3/UL (ref 0.7–3.1)
LYMPHOCYTES NFR BLD AUTO: 24 %
MCH RBC QN AUTO: 29.3 PG (ref 26.6–33)
MCHC RBC AUTO-ENTMCNC: 32.8 G/DL (ref 31.5–35.7)
MCV RBC AUTO: 89 FL (ref 79–97)
MONOCYTES # BLD AUTO: 0.6 X10E3/UL (ref 0.1–0.9)
MONOCYTES NFR BLD AUTO: 6 %
NEUTROPHILS # BLD AUTO: 6.3 X10E3/UL (ref 1.4–7)
NEUTROPHILS NFR BLD AUTO: 68 %
PLATELET # BLD AUTO: 299 X10E3/UL (ref 150–379)
POTASSIUM SERPL-SCNC: 4.2 MMOL/L (ref 3.5–5.2)
PROT SERPL-MCNC: 6.8 G/DL (ref 6–8.5)
RBC # BLD AUTO: 4.71 X10E6/UL (ref 3.77–5.28)
SODIUM SERPL-SCNC: 140 MMOL/L (ref 134–144)
TRIGL SERPL-MCNC: 66 MG/DL (ref 0–149)
TSH SERPL DL<=0.005 MIU/L-ACNC: 1.75 UIU/ML (ref 0.45–4.5)
VLDLC SERPL CALC-MCNC: 13 MG/DL (ref 5–40)
WBC # BLD AUTO: 9.5 X10E3/UL (ref 3.4–10.8)

## 2019-03-01 RX ORDER — ESCITALOPRAM OXALATE 20 MG/1
20 TABLET ORAL DAILY
Qty: 30 TABLET | Refills: 1 | Status: SHIPPED | OUTPATIENT
Start: 2019-03-01 | End: 2019-03-12 | Stop reason: SINTOL

## 2019-03-07 ENCOUNTER — TELEPHONE (OUTPATIENT)
Dept: OBSTETRICS AND GYNECOLOGY | Facility: CLINIC | Age: 49
End: 2019-03-07

## 2019-03-07 NOTE — TELEPHONE ENCOUNTER
Attempted to reach pt again about making an appt for a colpo due to abn pap. Told pt to return call when possible. PHILLIP

## 2019-03-12 ENCOUNTER — OFFICE VISIT (OUTPATIENT)
Dept: FAMILY MEDICINE CLINIC | Facility: CLINIC | Age: 49
End: 2019-03-12

## 2019-03-12 VITALS
WEIGHT: 171.8 LBS | HEART RATE: 65 BPM | HEIGHT: 63 IN | OXYGEN SATURATION: 98 % | DIASTOLIC BLOOD PRESSURE: 76 MMHG | RESPIRATION RATE: 20 BRPM | BODY MASS INDEX: 30.44 KG/M2 | SYSTOLIC BLOOD PRESSURE: 136 MMHG | TEMPERATURE: 97.5 F

## 2019-03-12 DIAGNOSIS — R53.83 FATIGUE, UNSPECIFIED TYPE: ICD-10-CM

## 2019-03-12 DIAGNOSIS — Z12.11 SCREENING FOR COLON CANCER: ICD-10-CM

## 2019-03-12 DIAGNOSIS — G47.00 INSOMNIA, UNSPECIFIED TYPE: ICD-10-CM

## 2019-03-12 DIAGNOSIS — F41.9 ANXIETY: Primary | ICD-10-CM

## 2019-03-12 DIAGNOSIS — F33.1 MODERATE EPISODE OF RECURRENT MAJOR DEPRESSIVE DISORDER (HCC): ICD-10-CM

## 2019-03-12 PROCEDURE — 99213 OFFICE O/P EST LOW 20 MIN: CPT | Performed by: NURSE PRACTITIONER

## 2019-03-12 RX ORDER — VENLAFAXINE HYDROCHLORIDE 37.5 MG/1
37.5 CAPSULE, EXTENDED RELEASE ORAL DAILY
Qty: 30 CAPSULE | Refills: 1 | Status: SHIPPED | OUTPATIENT
Start: 2019-03-12 | End: 2019-03-20 | Stop reason: DRUGHIGH

## 2019-03-12 NOTE — PATIENT INSTRUCTIONS
Living With Anxiety  After being diagnosed with an anxiety disorder, you may be relieved to know why you have felt or behaved a certain way. It is natural to also feel overwhelmed about the treatment ahead and what it will mean for your life. With care and support, you can manage this condition and recover from it.  How to cope with anxiety  Dealing with stress  Stress is your body’s reaction to life changes and events, both good and bad. Stress can last just a few hours or it can be ongoing. Stress can play a major role in anxiety, so it is important to learn both how to cope with stress and how to think about it differently.  Talk with your health care provider or a counselor to learn more about stress reduction. He or she may suggest some stress reduction techniques, such as:  · Music therapy. This can include creating or listening to music that you enjoy and that inspires you.  · Mindfulness-based meditation. This involves being aware of your normal breaths, rather than trying to control your breathing. It can be done while sitting or walking.  · Centering prayer. This is a kind of meditation that involves focusing on a word, phrase, or sacred image that is meaningful to you and that brings you peace.  · Deep breathing. To do this, expand your stomach and inhale slowly through your nose. Hold your breath for 3-5 seconds. Then exhale slowly, allowing your stomach muscles to relax.  · Self-talk. This is a skill where you identify thought patterns that lead to anxiety reactions and correct those thoughts.  · Muscle relaxation. This involves tensing muscles then relaxing them.    Choose a stress reduction technique that fits your lifestyle and personality. Stress reduction techniques take time and practice. Set aside 5-15 minutes a day to do them. Therapists can offer training in these techniques. The training may be covered by some insurance plans. Other things you can do to manage stress include:  · Keeping a  stress diary. This can help you learn what triggers your stress and ways to control your response.  · Thinking about how you respond to certain situations. You may not be able to control everything, but you can control your reaction.  · Making time for activities that help you relax, and not feeling guilty about spending your time in this way.    Therapy combined with coping and stress-reduction skills provides the best chance for successful treatment.  Medicines  Medicines can help ease symptoms. Medicines for anxiety include:  · Anti-anxiety drugs.  · Antidepressants.  · Beta-blockers.    Medicines may be used as the main treatment for anxiety disorder, along with therapy, or if other treatments are not working. Medicines should be prescribed by a health care provider.  Relationships  Relationships can play a big part in helping you recover. Try to spend more time connecting with trusted friends and family members. Consider going to couples counseling, taking family education classes, or going to family therapy. Therapy can help you and others better understand the condition.  How to recognize changes in your condition  Everyone has a different response to treatment for anxiety. Recovery from anxiety happens when symptoms decrease and stop interfering with your daily activities at home or work. This may mean that you will start to:  · Have better concentration and focus.  · Sleep better.  · Be less irritable.  · Have more energy.  · Have improved memory.    It is important to recognize when your condition is getting worse. Contact your health care provider if your symptoms interfere with home or work and you do not feel like your condition is improving.  Where to find help and support:  You can get help and support from these sources:  · Self-help groups.  · Online and community organizations.  · A trusted spiritual leader.  · Couples counseling.  · Family education classes.  · Family therapy.    Follow these  instructions at home:  · Eat a healthy diet that includes plenty of vegetables, fruits, whole grains, low-fat dairy products, and lean protein. Do not eat a lot of foods that are high in solid fats, added sugars, or salt.  · Exercise. Most adults should do the following:  ? Exercise for at least 150 minutes each week. The exercise should increase your heart rate and make you sweat (moderate-intensity exercise).  ? Strengthening exercises at least twice a week.  · Cut down on caffeine, tobacco, alcohol, and other potentially harmful substances.  · Get the right amount and quality of sleep. Most adults need 7-9 hours of sleep each night.  · Make choices that simplify your life.  · Take over-the-counter and prescription medicines only as told by your health care provider.  · Avoid caffeine, alcohol, and certain over-the-counter cold medicines. These may make you feel worse. Ask your pharmacist which medicines to avoid.  · Keep all follow-up visits as told by your health care provider. This is important.  Questions to ask your health care provider  · Would I benefit from therapy?  · How often should I follow up with a health care provider?  · How long do I need to take medicine?  · Are there any long-term side effects of my medicine?  · Are there any alternatives to taking medicine?  Contact a health care provider if:  · You have a hard time staying focused or finishing daily tasks.  · You spend many hours a day feeling worried about everyday life.  · You become exhausted by worry.  · You start to have headaches, feel tense, or have nausea.  · You urinate more than normal.  · You have diarrhea.  Get help right away if:  · You have a racing heart and shortness of breath.  · You have thoughts of hurting yourself or others.  If you ever feel like you may hurt yourself or others, or have thoughts about taking your own life, get help right away. You can go to your nearest emergency department or call:  · Your local emergency  services (911 in the [place]  · A suicide crisis helpline, such as the National Suicide Prevention Lifeline at 1-933.183.9797. This is open 24-hours a day.    Summary  · Taking steps to deal with stress can help calm you.  · Medicines cannot cure anxiety disorders, but they can help ease symptoms.  · Family, friends, and partners can play a big part in helping you recover from an anxiety disorder.  This information is not intended to replace advice given to you by your health care provider. Make sure you discuss any questions you have with your health care provider.  Document Released: 12/12/2017 Document Revised: 12/12/2017 Document Reviewed: 12/12/2017  [employer] Interactive Patient Education © 2019 [employer]

## 2019-03-12 NOTE — PROGRESS NOTES
Subjective   Janey Peña is a 48 y.o. female.     Chief Complaint   Patient presents with   • Anxiety      HPI   Patient returns today for anxiety f/u.  She reports she became more sleepy on lexapro and was battling fatigue and sleepiness all day long, making it difficult to work, and she got sleepy while driving.  She also noticed an increase in her irritability while on lexapro.  She ran out about 2-3 days ago and she does not want to refill, but would like to try another medication.  She has also tapered off phentermine and has noticed she has gained some weight.  She has also not been getting her B12 injections that she was getting at her weight loss clinic and is requesting B12 injection due to fatigue which they did improve.    She is happy with improvement in sleep.  Is able to fall asleep now, even though she is waking up still several times per night, she is able to fall right back to sleep.   Denies any personal or FH bipolar.     Social History     Tobacco Use   • Smoking status: Never Smoker   • Smokeless tobacco: Never Used   Substance Use Topics   • Alcohol use: Yes     Alcohol/week: 4.2 oz     Types: 7 Glasses of wine per week   • Drug use: No       The following portions of the patient's history were reviewed and updated as appropriate: allergies, current medications, past family history, past medical history, past social history, past surgical history and problem list.    Review of Systems   Constitutional: Positive for activity change (having trouble exercising), appetite change (increased) and fatigue. Negative for chills and fever.   Respiratory: Negative for cough and shortness of breath.    Cardiovascular: Negative for chest pain and palpitations.   Gastrointestinal: Negative for abdominal pain.   Musculoskeletal: Positive for arthralgias (chronic joint and hand pain due to arthritis) and myalgias. Negative for joint swelling.   Neurological: Negative for weakness and numbness.  "  Psychiatric/Behavioral: Positive for dysphoric mood. The patient is nervous/anxious.        Objective   Blood pressure 136/76, pulse 65, temperature 97.5 °F (36.4 °C), resp. rate 20, height 161 cm (63.39\"), weight 77.9 kg (171 lb 12.8 oz), SpO2 98 %, not currently breastfeeding.    Physical Exam   Constitutional: She is oriented to person, place, and time. She appears well-developed and well-nourished. No distress.   HENT:   Head: Normocephalic and atraumatic.   Cardiovascular: Normal rate, regular rhythm, normal heart sounds and intact distal pulses.   No murmur heard.  Pulmonary/Chest: Effort normal and breath sounds normal.   Abdominal: Soft. Bowel sounds are normal. There is no tenderness.   Neurological: She is alert and oriented to person, place, and time.   Skin: Skin is warm and dry.   Psychiatric: She has a normal mood and affect.   Nursing note and vitals reviewed.      Assessment   Problem List Items Addressed This Visit        Other    Anxiety - Primary      Other Visit Diagnoses     Moderate episode of recurrent major depressive disorder (CMS/HCC)        Relevant Medications    venlafaxine XR (EFFEXOR XR) 37.5 MG 24 hr capsule    Other Relevant Orders    Vitamin B12    Fatigue, unspecified type        Relevant Orders    Vitamin B12    Screening for colon cancer        Relevant Orders    Ambulatory Referral For Screening Colonoscopy           Procedures PHQ-9 Total Score: 13   SANIA 7 Total Score: 14             Impression and Plan: Will stop lexapro and try effexor. I have discussed with patient that we can increase dose after 1 week if she feels like she needs to.  We also discussed side effects.  Has sleep eval next week, so will hold off on any additional meds for sleep.  Will check B12 for fatigue.    Follow up after sleep medicine.        Health Maintenance Due   Topic Date Due   • TDAP/TD VACCINES (1 - Tdap) 11/19/1987   • ANNUAL PHYSICAL  07/15/2018         "

## 2019-03-13 LAB — VIT B12 SERPL-MCNC: 744 PG/ML (ref 211–946)

## 2019-03-22 DIAGNOSIS — F41.9 ANXIETY: ICD-10-CM

## 2019-03-22 RX ORDER — VENLAFAXINE HYDROCHLORIDE 75 MG/1
75 CAPSULE, EXTENDED RELEASE ORAL DAILY
Qty: 30 CAPSULE | Refills: 3 | Status: SHIPPED | OUTPATIENT
Start: 2019-03-22 | End: 2019-08-20

## 2019-06-28 DIAGNOSIS — F41.9 ANXIETY: ICD-10-CM

## 2019-07-08 ENCOUNTER — OFFICE VISIT (OUTPATIENT)
Dept: OBSTETRICS AND GYNECOLOGY | Age: 49
End: 2019-07-08

## 2019-07-08 VITALS
HEIGHT: 63 IN | WEIGHT: 179 LBS | DIASTOLIC BLOOD PRESSURE: 76 MMHG | BODY MASS INDEX: 31.71 KG/M2 | SYSTOLIC BLOOD PRESSURE: 120 MMHG

## 2019-07-08 DIAGNOSIS — B97.7 HPV IN FEMALE: ICD-10-CM

## 2019-07-08 DIAGNOSIS — R23.2 HOT FLASHES: Primary | ICD-10-CM

## 2019-07-08 PROCEDURE — 99214 OFFICE O/P EST MOD 30 MIN: CPT | Performed by: NURSE PRACTITIONER

## 2019-07-08 NOTE — PROGRESS NOTES
[facility]  Routine Annual Visit    2019    Patient: Janey Peña          MR#:[id number]      History of Present Illness    48 y.o. female  who presents with complaint of severe hot flashes and night sweats interfering with quality of life. Janey reports sleep has been significantly altered. We had a long discussion regarding her vasomotor symptoms consistent with menopause. She is currently on effexor but has not noticed any improvement in hot flashes. We had a long discussion regarding the option of HRT but the concern regarding her high risk of breast cancer. She has been followed by Dr. Love and Dr Matamoros to discuss risk reduction strategies. I explained why I am hesitant to start her on hormones with this risk. She had a negative breast MRI 2018. She is due for follow up with Dr. Love and breast imaging which has been ordered through him. She requested appointment with Ivan through [facility] during our visit. She will follow through with this appointment. Pap smear done today.     No LMP recorded. Patient has had a hysterectomy.  Obstetric History:  OB History      Para Term  AB Living    2 2 2          SAB TAB Ectopic Molar Multiple Live Births                       Obstetric Comments    Took birth control for 3 years.          Menstrual History:     No LMP recorded. Patient has had a hysterectomy.       Sexual History:       ________________________________________  Patient Active Problem List   Diagnosis   • Anxiety   • Headache, migraine   • Cannot sleep   • Screening for breast cancer   • Allergic rhinitis due to cats   • Annual physical exam   • Allergic rhinitis   • At high risk for breast cancer       Past Medical History:   Diagnosis Date   • Anxiety    • At high risk for breast cancer    • Breast cyst    • Endometriosis    • History of prior pregnancies     x2   • Hyperlipidemia    • Hypertension    • Mastodynia    • Ovarian cyst        Past  Surgical History:   Procedure Laterality Date   • BREAST BIOPSY Right     Benign   •  SECTION     • HYSTERECTOMY     • SHOULDER SURGERY         Social History     Tobacco Use   Smoking Status Never Smoker   Smokeless Tobacco Never Used       Family History   Problem Relation Age of Onset   • Breast cancer Mother 56   • Heart disease Mother    • Hyperlipidemia Mother    • Hypertension Mother    • Diabetes Mother    • Heart attack Mother    • Hypothyroidism Mother    • Anxiety disorder Father    • Depression Father    • Hearing loss Father    • Hypertension Sister    • Asthma Sister    • COPD Sister    • Depression Sister    • Hyperlipidemia Sister    • Hypothyroidism Sister    • Cancer Paternal Uncle 70        bladder   • Breast cancer Paternal Grandmother 60   • Lung cancer Paternal Grandmother        Prior to Admission medications    Medication Sig Start Date End Date Taking? Authorizing Provider   Ibuprofen (ADVIL PO) Take  by mouth.   Yes Provider, MD Cole   phentermine 30 MG capsule Take 30 mg by mouth Every Morning.   Yes Provider, MD Cole   venlafaxine XR (EFFEXOR XR) 75 MG 24 hr capsule Take 1 capsule by mouth Daily. 3/22/19  Yes Anaya Cabezas APRN     ________________________________________  The following portions of the patient's history were reviewed and updated as appropriate: allergies, current medications, past family history, past medical history, past social history, past surgical history and problem list.    Review of Systems   Constitutional: Negative for chills, fatigue and fever.   Gastrointestinal: Negative for abdominal distention and abdominal pain.   Endocrine: Positive for heat intolerance.   Genitourinary: Negative for decreased urine volume, difficulty urinating, dyspareunia, dysuria, frequency, genital sores, menstrual problem, pelvic pain, urgency, vaginal bleeding, vaginal discharge and vaginal pain.     Objective   Physical Exam    /76   Ht 160 cm  "(63\")   Wt 81.2 kg (179 lb)   Breastfeeding? No   BMI 31.71 kg/m²    BP Readings from Last 3 Encounters:   07/08/19 120/76   03/12/19 136/76   02/20/19 130/72      Wt Readings from Last 3 Encounters:   07/08/19 81.2 kg (179 lb)   03/12/19 77.9 kg (171 lb 12.8 oz)   02/20/19 76.2 kg (168 lb)        BMI: Estimated body mass index is 31.71 kg/m² as calculated from the following:    Height as of this encounter: 160 cm (63\").    Weight as of this encounter: 81.2 kg (179 lb).        General:   alert, appears stated age, cooperative and no distress                   Vulva: normal   Vagina: normal mucosa, normal discharge   Cervix: absent   Uterus: absent    Adnexa: no mass, fullness, tenderness     Assessment:    1. Hot flashes- discussed options with Janey and my concerns with HRT. All risks of HRT discussed should she decide to begin therapy.  Made aware of the black box warming from FDA about heart attacks, strokes, breast cancer, uterine cancer, and VTE risks.I explained I would like for her to discuss with Dr. Love first before initiating any therapy and she is in agreement. We discussed lifestyle modifications to improve symptoms and handout on menopausal symptoms given. Herbal supplements will need to be discussed with PCP before implementing. She will call me following her appointment with Dr. Love. She agrees with this plan.    25 minutes spent with patient face to face with >50% spent in counseling and coordination of care     Plan:    []  Rx:   []  Mammogram request made  []  PAP done  []  Occult fecal blood test ([employer])  []  Labs:   []  GC/Chl/TV  []  DEXA scan   []  Referral for colonoscopy:           Rosalee Bauer, KISHOR  7/8/2019 3:16 PM  "

## 2019-07-10 LAB
C TRACH RRNA SPEC QL NAA+PROBE: NEGATIVE
CYTOLOGIST CVX/VAG CYTO: NORMAL
CYTOLOGY CVX/VAG DOC CYTO: NORMAL
CYTOLOGY CVX/VAG DOC THIN PREP: NORMAL
DX ICD CODE: NORMAL
HIV 1 & 2 AB SER-IMP: NORMAL
HPV I/H RISK 4 DNA CVX QL PROBE+SIG AMP: NEGATIVE
N GONORRHOEA RRNA SPEC QL NAA+PROBE: NEGATIVE
OTHER STN SPEC: NORMAL
STAT OF ADQ CVX/VAG CYTO-IMP: NORMAL
T VAGINALIS RRNA VAG QL NAA+PROBE: NEGATIVE

## 2019-07-11 ENCOUNTER — TELEPHONE (OUTPATIENT)
Dept: OBSTETRICS AND GYNECOLOGY | Age: 49
End: 2019-07-11

## 2020-01-20 RX ORDER — VENLAFAXINE HYDROCHLORIDE 75 MG/1
CAPSULE, EXTENDED RELEASE ORAL
Qty: 30 CAPSULE | Refills: 2 | Status: SHIPPED | OUTPATIENT
Start: 2020-01-20 | End: 2020-03-19 | Stop reason: SDUPTHER

## 2020-03-19 ENCOUNTER — OFFICE VISIT (OUTPATIENT)
Dept: FAMILY MEDICINE CLINIC | Facility: CLINIC | Age: 50
End: 2020-03-19

## 2020-03-19 VITALS
RESPIRATION RATE: 16 BRPM | DIASTOLIC BLOOD PRESSURE: 82 MMHG | HEIGHT: 63 IN | TEMPERATURE: 98 F | OXYGEN SATURATION: 99 % | SYSTOLIC BLOOD PRESSURE: 116 MMHG | HEART RATE: 93 BPM | BODY MASS INDEX: 34.75 KG/M2 | WEIGHT: 196.1 LBS

## 2020-03-19 DIAGNOSIS — J30.89 NON-SEASONAL ALLERGIC RHINITIS, UNSPECIFIED TRIGGER: Primary | ICD-10-CM

## 2020-03-19 PROCEDURE — 99213 OFFICE O/P EST LOW 20 MIN: CPT | Performed by: NURSE PRACTITIONER

## 2020-03-19 RX ORDER — VENLAFAXINE HYDROCHLORIDE 75 MG/1
75 CAPSULE, EXTENDED RELEASE ORAL DAILY
Qty: 30 CAPSULE | Refills: 2 | Status: SHIPPED | OUTPATIENT
Start: 2020-03-19 | End: 2020-07-31 | Stop reason: SDUPTHER

## 2020-03-19 NOTE — PROGRESS NOTES
Subjective   Janey Peña is a 49 y.o. female.     Chief Complaint   Patient presents with   • Sore Throat     comes and goes d/t year round allergies. some cough but not unusual      HPI  Here today for sore throat, tickle in her throat that causes an intermittent dry cough.  This is not new. She has persistent allergies that are worse this time of year. She has not had a fever or chills. Denies known exposure to flu or coronavirus or PUI.  No travel recently.  Has allergy meds but does not take it consistently.     Anxiety has been well controlled with effexor and would like refill.     Social History     Tobacco Use   • Smoking status: Never Smoker   • Smokeless tobacco: Never Used   Substance Use Topics   • Alcohol use: Yes     Alcohol/week: 7.0 standard drinks     Types: 7 Glasses of wine per week   • Drug use: No       The following portions of the patient's history were reviewed and updated as appropriate: allergies, current medications, past family history, past medical history, past social history, past surgical history and problem list.    Review of Systems   Constitutional: Negative for activity change, appetite change, chills, fatigue, fever and unexpected weight change.   HENT: Positive for congestion, postnasal drip, rhinorrhea, sinus pressure, sore throat (feels scratchy) and voice change. Negative for ear discharge, ear pain and trouble swallowing.    Eyes: Negative for discharge and itching.   Respiratory: Positive for cough (occasional dry cough). Negative for chest tightness, shortness of breath and wheezing.    Cardiovascular: Negative for chest pain and palpitations.   Gastrointestinal: Negative for abdominal pain, diarrhea, nausea and vomiting.   Genitourinary: Negative for dysuria, hematuria and urgency.   Musculoskeletal: Negative for arthralgias and myalgias.   Allergic/Immunologic: Positive for environmental allergies (all year round).   Neurological: Positive for headaches. Negative for  "dizziness and weakness.   Hematological: Negative for adenopathy.       Objective   Blood pressure 116/82, pulse 93, temperature 98 °F (36.7 °C), resp. rate 16, height 160 cm (63\"), weight 89 kg (196 lb 1.6 oz), SpO2 99 %, not currently breastfeeding.  Body mass index is 34.74 kg/m².    Physical Exam   Constitutional: She is oriented to person, place, and time. She appears well-developed and well-nourished. No distress.   HENT:   Head: Normocephalic and atraumatic.   Right Ear: Tympanic membrane, external ear and ear canal normal.   Left Ear: Tympanic membrane, external ear and ear canal normal.   Nose: Right sinus exhibits no maxillary sinus tenderness and no frontal sinus tenderness. Left sinus exhibits no maxillary sinus tenderness and no frontal sinus tenderness.   Mouth/Throat: Posterior oropharyngeal erythema (PND) present. No tonsillar exudate.   Eyes: Conjunctivae are normal. Right eye exhibits no discharge. Left eye exhibits no discharge.   Neck: Neck supple.   Cardiovascular: Normal rate and regular rhythm.   Pulmonary/Chest: Effort normal and breath sounds normal. She has no wheezes. She has no rales.   Abdominal: Soft. Bowel sounds are normal. There is no tenderness.   Musculoskeletal: She exhibits no deformity.   Gait smooth and steady   Lymphadenopathy:     She has no cervical adenopathy.   Neurological: She is alert and oriented to person, place, and time.   Skin: Skin is warm and dry. She is not diaphoretic.   Psychiatric: She has a normal mood and affect.   Nursing note and vitals reviewed.      Assessment   Problem List Items Addressed This Visit        Respiratory    Allergic rhinitis - Primary           Procedures           Impression and Plan:  Most likely has allergic rhinitis.  No known exposure to PUI or travel putting her at low risk.  I think she can return to work as long as she is asymptomatic.  I recommend she take allergy controlling medicine daily for better control to with recent " weather changes and pollen counts. We discussed s/s requiring emergent tx or follow up care.         Health Maintenance Due   Topic Date Due   • TDAP/TD VACCINES (1 - Tdap) 11/19/1979   • HEPATITIS C SCREENING  04/18/2017   • COLONOSCOPY  04/18/2017   • ANNUAL PHYSICAL  07/15/2018   • LIPID PANEL  02/20/2020   • ZOSTER VACCINE (2 of 2) 03/11/2020              EMR Dragon/Transcription disclaimer:   Much of this encounter note is an electronic transcription/translation of spoken language to printed text. The electronic translation of spoken language may permit erroneous, or at times, nonsensical words or phrases to be inadvertently transcribed; Although I have reviewed the note for such errors, some may still exist.    Allergic Rhinitis, Adult  Allergic rhinitis is an allergic reaction that affects the mucous membrane inside the nose. It causes sneezing, a runny or stuffy nose, and the feeling of mucus going down the back of the throat (postnasal drip). Allergic rhinitis can be mild to severe.  There are two types of allergic rhinitis:  · Seasonal. This type is also called hay fever. It happens only during certain seasons.  · Perennial. This type can happen at any time of the year.  What are the causes?  This condition happens when the body's defense system (immune system) responds to certain harmless substances called allergens as though they were germs.   Seasonal allergic rhinitis is triggered by pollen, which can come from grasses, trees, and weeds. Perennial allergic rhinitis may be caused by:  · House dust mites.  · Pet dander.  · Mold spores.  What are the signs or symptoms?  Symptoms of this condition include:  · Sneezing.  · Runny or stuffy nose (nasal congestion).  · Postnasal drip.  · Itchy nose.  · Tearing of the eyes.  · Trouble sleeping.  · Daytime sleepiness.  How is this diagnosed?  This condition may be diagnosed based on:  · Your medical history.  · A physical exam.  · Tests to check for related  conditions, such as:  ? Asthma.  ? Pink eye.  ? Ear infection.  ? Upper respiratory infection.  · Tests to find out which allergens trigger your symptoms. These may include skin or blood tests.  How is this treated?  There is no cure for this condition, but treatment can help control symptoms. Treatment may include:  · Taking medicines that block allergy symptoms, such as antihistamines. Medicine may be given as a shot, nasal spray, or pill.  · Avoiding the allergen.  · Desensitization. This treatment involves getting ongoing shots until your body becomes less sensitive to the allergen. This treatment may be done if other treatments do not help.  · If taking medicine and avoiding the allergen does not work, new, stronger medicines may be prescribed.  Follow these instructions at home:  · Find out what you are allergic to. Common allergens include smoke, dust, and pollen.  · Avoid the things you are allergic to. These are some things you can do to help avoid allergens:  ? Replace carpet with wood, tile, or vinyl sami. Carpet can trap dander and dust.  ? Do not smoke. Do not allow smoking in your home.  ? Change your heating and air conditioning filter at least once a month.  ? During allergy season:  § Keep windows closed as much as possible.  § Plan outdoor activities when pollen counts are lowest. This is usually during the evening hours.  § When coming indoors, change clothing and shower before sitting on furniture or bedding.  · Take over-the-counter and prescription medicines only as told by your health care provider.  · Keep all follow-up visits as told by your health care provider. This is important.  Contact a health care provider if:  · You have a fever.  · You develop a persistent cough.  · You make whistling sounds when you breathe (you wheeze).  · Your symptoms interfere with your normal daily activities.  Get help right away if:  · You have shortness of breath.  Summary  · This condition can be  managed by taking medicines as directed and avoiding allergens.  · Contact your health care provider if you develop a persistent cough or fever.  · During allergy season, keep windows closed as much as possible.  This information is not intended to replace advice given to you by your health care provider. Make sure you discuss any questions you have with your health care provider.  Document Released: 09/12/2002 Document Revised: 01/25/2018 Document Reviewed: 01/25/2018  [employer] Interactive Patient Education © 2020 [employer]

## 2020-03-19 NOTE — PATIENT INSTRUCTIONS
Allergic Rhinitis, Adult  Allergic rhinitis is an allergic reaction that affects the mucous membrane inside the nose. It causes sneezing, a runny or stuffy nose, and the feeling of mucus going down the back of the throat (postnasal drip). Allergic rhinitis can be mild to severe.  There are two types of allergic rhinitis:  · Seasonal. This type is also called hay fever. It happens only during certain seasons.  · Perennial. This type can happen at any time of the year.  What are the causes?  This condition happens when the body's defense system (immune system) responds to certain harmless substances called allergens as though they were germs.   Seasonal allergic rhinitis is triggered by pollen, which can come from grasses, trees, and weeds. Perennial allergic rhinitis may be caused by:  · House dust mites.  · Pet dander.  · Mold spores.  What are the signs or symptoms?  Symptoms of this condition include:  · Sneezing.  · Runny or stuffy nose (nasal congestion).  · Postnasal drip.  · Itchy nose.  · Tearing of the eyes.  · Trouble sleeping.  · Daytime sleepiness.  How is this diagnosed?  This condition may be diagnosed based on:  · Your medical history.  · A physical exam.  · Tests to check for related conditions, such as:  ? Asthma.  ? Pink eye.  ? Ear infection.  ? Upper respiratory infection.  · Tests to find out which allergens trigger your symptoms. These may include skin or blood tests.  How is this treated?  There is no cure for this condition, but treatment can help control symptoms. Treatment may include:  · Taking medicines that block allergy symptoms, such as antihistamines. Medicine may be given as a shot, nasal spray, or pill.  · Avoiding the allergen.  · Desensitization. This treatment involves getting ongoing shots until your body becomes less sensitive to the allergen. This treatment may be done if other treatments do not help.  · If taking medicine and avoiding the allergen does not work, new, stronger  medicines may be prescribed.  Follow these instructions at home:  · Find out what you are allergic to. Common allergens include smoke, dust, and pollen.  · Avoid the things you are allergic to. These are some things you can do to help avoid allergens:  ? Replace carpet with wood, tile, or vinyl sami. Carpet can trap dander and dust.  ? Do not smoke. Do not allow smoking in your home.  ? Change your heating and air conditioning filter at least once a month.  ? During allergy season:  § Keep windows closed as much as possible.  § Plan outdoor activities when pollen counts are lowest. This is usually during the evening hours.  § When coming indoors, change clothing and shower before sitting on furniture or bedding.  · Take over-the-counter and prescription medicines only as told by your health care provider.  · Keep all follow-up visits as told by your health care provider. This is important.  Contact a health care provider if:  · You have a fever.  · You develop a persistent cough.  · You make whistling sounds when you breathe (you wheeze).  · Your symptoms interfere with your normal daily activities.  Get help right away if:  · You have shortness of breath.  Summary  · This condition can be managed by taking medicines as directed and avoiding allergens.  · Contact your health care provider if you develop a persistent cough or fever.  · During allergy season, keep windows closed as much as possible.  This information is not intended to replace advice given to you by your health care provider. Make sure you discuss any questions you have with your health care provider.  Document Released: 09/12/2002 Document Revised: 01/25/2018 Document Reviewed: 01/25/2018  Elsevier Interactive Patient Education © 2020 [employer]

## 2020-04-02 ENCOUNTER — TELEMEDICINE (OUTPATIENT)
Dept: FAMILY MEDICINE CLINIC | Facility: CLINIC | Age: 50
End: 2020-04-02

## 2020-04-02 DIAGNOSIS — G43.009 MIGRAINE WITHOUT AURA AND WITHOUT STATUS MIGRAINOSUS, NOT INTRACTABLE: Primary | ICD-10-CM

## 2020-04-02 DIAGNOSIS — Z76.89 RETURN TO WORK EVALUATION: ICD-10-CM

## 2020-04-02 PROCEDURE — 99213 OFFICE O/P EST LOW 20 MIN: CPT | Performed by: NURSE PRACTITIONER

## 2020-04-02 NOTE — PROGRESS NOTES
Janey Peña    1970  [id number]        [facility]  Pt requested video call to discuss headaches and when she is able to return to work.     Pt has just completed a 14 day quarantine for cough, congestion which has improved.  She has not had any known COVID exposure since completing her quarantine.  She has not had any worsening symptoms other than a headache since February which has not worsened over the last several days.  This is associated with an infrequent dry cough during day which is also not unusual for her.     ALAS is on right side which is typical of her usual migraine HA, however this headache starts in the back of her head and goes forward which is not typical.  She has been taking OTC migraine meds without relief.  Does not feel like sinus or allergy type HA.  May be due to stress and decreased activity. She does not have an aura with her HA which is typical for her.  Headaches are not severe, not associated with any weakness, difficulty speaking, vision changes, dizziness, seizures.  They are not thunderclap type headaches.      She began her initial 2 weeks of quarantine on March 24th.  This will end April 6th. I think it should be OK for patient to return to work after this completion of 14 days.  This would be April 7th.  None of her current sx seem suggestive of COVID.        Review of Systems - General ROS: negative for - chills, fatigue, fever or malaise  ENT ROS: positive for - headaches  negative for - nasal congestion, nasal discharge, sinus pain, sore throat or vocal changes  Respiratory ROS: negative for - shortness of breath, sputum changes or wheezing  Gastrointestinal ROS: negative for - abdominal pain, diarrhea or nausea/vomiting    Exam:  Limited by video encounter.  Pt was well appearing.  She was alert and oriented, good historian of medical history.  Mood and affect seemed normal.  I did not appreciate a cough or dyspnea, was able to complete sentences without difficulty.     Plan:   Pt will need note to go back to work on April 7th.  We discussed this and she is agreeable with this plan. We discussed mgmt of her HA which do not seem to be much different than her chronic migraines, but may have an element of stress type HA component. We discussed mgmt of these and We discussed s/s requiring emergent tx. She does not have and RED FLAGS associated with her headaches currently.        Diagnoses and all orders for this visit:    Migraine without aura and without status migrainosus, not intractable    Return to work evaluation        Any medications prescribed have been sent electronically to   [facility] - [employer] [place], [place] - [address] [place] [address] - [phone]  - [phone] [address] [address]  Phone: [phone] Fax: [phone]        KISHOR Arana  04/06/20  10:46 AM

## 2020-07-31 ENCOUNTER — TELEMEDICINE (OUTPATIENT)
Dept: FAMILY MEDICINE CLINIC | Facility: CLINIC | Age: 50
End: 2020-07-31

## 2020-07-31 ENCOUNTER — E-VISIT (OUTPATIENT)
Dept: FAMILY MEDICINE CLINIC | Facility: CLINIC | Age: 50
End: 2020-07-31

## 2020-07-31 DIAGNOSIS — F51.04 PSYCHOPHYSIOLOGICAL INSOMNIA: ICD-10-CM

## 2020-07-31 DIAGNOSIS — F41.1 GENERALIZED ANXIETY DISORDER: Primary | ICD-10-CM

## 2020-07-31 PROCEDURE — 99213 OFFICE O/P EST LOW 20 MIN: CPT | Performed by: NURSE PRACTITIONER

## 2020-07-31 RX ORDER — BUSPIRONE HYDROCHLORIDE 10 MG/1
10 TABLET ORAL 2 TIMES DAILY
Qty: 60 TABLET | Refills: 1 | Status: SHIPPED | OUTPATIENT
Start: 2020-07-31 | End: 2020-08-31 | Stop reason: SDUPTHER

## 2020-07-31 RX ORDER — VENLAFAXINE HYDROCHLORIDE 75 MG/1
75 CAPSULE, EXTENDED RELEASE ORAL DAILY
Qty: 30 CAPSULE | Refills: 2 | Status: SHIPPED | OUTPATIENT
Start: 2020-07-31 | End: 2020-09-10 | Stop reason: DRUGHIGH

## 2020-07-31 NOTE — PATIENT INSTRUCTIONS
Generalized Anxiety Disorder, Adult  Generalized anxiety disorder (SANIA) is a mental health disorder. People with this condition constantly worry about everyday events. Unlike normal anxiety, worry related to SANIA is not triggered by a specific event. These worries also do not fade or get better with time. SANIA interferes with life functions, including relationships, work, and school.  SANIA can vary from mild to severe. People with severe SANIA can have intense waves of anxiety with physical symptoms (panic attacks).  What are the causes?  The exact cause of SANIA is not known.  What increases the risk?  This condition is more likely to develop in:  · Women.  · People who have a family history of anxiety disorders.  · People who are very shy.  · People who experience very stressful life events, such as the death of a loved one.  · People who have a very stressful family environment.  What are the signs or symptoms?  People with SANIA often worry excessively about many things in their lives, such as their health and family. They may also be overly concerned about:  · Doing well at work.  · Being on time.  · Natural disasters.  · Friendships.  Physical symptoms of SANIA include:  · Fatigue.  · Muscle tension or having muscle twitches.  · Trembling or feeling shaky.  · Being easily startled.  · Feeling like your heart is pounding or racing.  · Feeling out of breath or like you cannot take a deep breath.  · Having trouble falling asleep or staying asleep.  · Sweating.  · Nausea, diarrhea, or irritable bowel syndrome (IBS).  · Headaches.  · Trouble concentrating or remembering facts.  · Restlessness.  · Irritability.  How is this diagnosed?  Your health care provider can diagnose SANIA based on your symptoms and medical history. You will also have a physical exam. The health care provider will ask specific questions about your symptoms, including how severe they are, when they started, and if they come and go. Your health care  provider may ask you about your use of alcohol or drugs, including prescription medicines. Your health care provider may refer you to a mental health specialist for further evaluation.  Your health care provider will do a thorough examination and may perform additional tests to rule out other possible causes of your symptoms.  To be diagnosed with SANIA, a person must have anxiety that:  · Is out of his or her control.  · Affects several different aspects of his or her life, such as work and relationships.  · Causes distress that makes him or her unable to take part in normal activities.  · Includes at least three physical symptoms of SANIA, such as restlessness, fatigue, trouble concentrating, irritability, muscle tension, or sleep problems.  Before your health care provider can confirm a diagnosis of SANIA, these symptoms must be present more days than they are not, and they must last for six months or longer.  How is this treated?  The following therapies are usually used to treat SANIA:  · Medicine. Antidepressant medicine is usually prescribed for long-term daily control. Antianxiety medicines may be added in severe cases, especially when panic attacks occur.  · Talk therapy (psychotherapy). Certain types of talk therapy can be helpful in treating SANIA by providing support, education, and guidance. Options include:  ? Cognitive behavioral therapy (CBT). People learn coping skills and techniques to ease their anxiety. They learn to identify unrealistic or negative thoughts and behaviors and to replace them with positive ones.  ? Acceptance and commitment therapy (ACT). This treatment teaches people how to be mindful as a way to cope with unwanted thoughts and feelings.  ? Biofeedback. This process trains you to manage your body's response (physiological response) through breathing techniques and relaxation methods. You will work with a therapist while machines are used to monitor your physical symptoms.  · Stress  management techniques. These include yoga, meditation, and exercise.  A mental health specialist can help determine which treatment is best for you. Some people see improvement with one type of therapy. However, other people require a combination of therapies.  Follow these instructions at home:  · Take over-the-counter and prescription medicines only as told by your health care provider.  · Try to maintain a normal routine.  · Try to anticipate stressful situations and allow extra time to manage them.  · Practice any stress management or self-calming techniques as taught by your health care provider.  · Do not punish yourself for setbacks or for not making progress.  · Try to recognize your accomplishments, even if they are small.  · Keep all follow-up visits as told by your health care provider. This is important.  Contact a health care provider if:  · Your symptoms do not get better.  · Your symptoms get worse.  · You have signs of depression, such as:  ? A persistently sad, cranky, or irritable mood.  ? Loss of enjoyment in activities that used to bring you deandre.  ? Change in weight or eating.  ? Changes in sleeping habits.  ? Avoiding friends or family members.  ? Loss of energy for normal tasks.  ? Feelings of guilt or worthlessness.  Get help right away if:  · You have serious thoughts about hurting yourself or others.  If you ever feel like you may hurt yourself or others, or have thoughts about taking your own life, get help right away. You can go to your nearest emergency department or call:  · Your local emergency services (911 in the [place]  · A suicide crisis helpline, such as the National Suicide Prevention Lifeline at 1-286.113.5007. This is open 24 hours a day.  Summary  · Generalized anxiety disorder (SANIA) is a mental health disorder that involves worry that is not triggered by a specific event.  · People with SANIA often worry excessively about many things in their lives, such as their health and  family.  · SANIA may cause physical symptoms such as restlessness, trouble concentrating, sleep problems, frequent sweating, nausea, diarrhea, headaches, and trembling or muscle twitching.  · A mental health specialist can help determine which treatment is best for you. Some people see improvement with one type of therapy. However, other people require a combination of therapies.  This information is not intended to replace advice given to you by your health care provider. Make sure you discuss any questions you have with your health care provider.  Document Released: 04/14/2014 Document Revised: 11/30/2018 Document Reviewed: 11/07/2017  Elsevier Patient Education © 2020 [employer]

## 2020-07-31 NOTE — PROGRESS NOTES
Subjective   Janey Peña is a 49 y.o. female.     Chief Complaint   Patient presents with   • Anxiety      HPI patient scheduled video visit today for increased anxiety over the last couple weeks.  She reports about 3 panic attacks per day.  These usually occur in the evening when she is home by herself.  She has had 1 or 2 while at work in her car.  She has recently restarted the Effexor at 75 mg which she had been taking previously but thought it was not helpful.  She does not think that the Effexor dose is efficacious enough.  She takes it in the evening as it does make her drowsy and able to sleep in the evening.    She did switch jobs over the last couple months and although she does like her new job she does have a different work schedule and has to get up earlier so her insomnia is a little bit more difficult to manage and sometimes gets less sleep.    She also reports she is gone through some increased personal problems and these have added to her anxiety.    She is previously been on Zoloft and Wellbutrin without any improvement.      Social History     Tobacco Use   • Smoking status: Never Smoker   • Smokeless tobacco: Never Used   Substance Use Topics   • Alcohol use: Yes     Alcohol/week: 7.0 standard drinks     Types: 7 Glasses of wine per week   • Drug use: No       The following portions of the patient's history were reviewed and updated as appropriate: allergies, current medications, past family history, past medical history, past social history, past surgical history and problem list.    Review of Systems   Constitutional: Positive for fatigue. Negative for activity change, appetite change and unexpected weight change.   Neurological: Positive for headaches. Negative for weakness.   Psychiatric/Behavioral: Positive for dysphoric mood and sleep disturbance. Negative for decreased concentration, self-injury and suicidal ideas. The patient is nervous/anxious.        Objective   not currently  breastfeeding.  There is no height or weight on file to calculate BMI.    Physical Exam   Psychiatric: She has a normal mood and affect. Her speech is normal and behavior is normal. Thought content normal. Cognition and memory are normal.   Seems open and forthcoming       Limited by video visit.  She is well appearing and does not seem to be distressed.  She seems alert and oriented and her mood and affect are normal, good historian of medical history.  No cough or dyspnea appreciated, able to complete sentences without problem.       Assessment   Problem List Items Addressed This Visit        Other    Cannot sleep      Other Visit Diagnoses     Generalized anxiety disorder    -  Primary    Relevant Medications    busPIRone (BUSPAR) 10 MG tablet    venlafaxine XR (EFFEXOR-XR) 75 MG 24 hr capsule           Procedures           Impression and Plan: Anxiety disorder: She has restarted Effexor at 75 mg dose which she seems to be tolerating well although she does denies that it is effective.  She does take it in the evening and reports it causes drowsiness.  We will add BuSpar twice a day and she will let me know how this is working.  We discussed that this is not a as needed medicine but works better if taking scheduled but does not require weaning process or delay of onset.  However I did discuss with her that she should not stop the Effexor cold turkey and should be weaned.  She may have adverse effects when doing so.  She tends to stop and start medicines so want to make sure I do not give her anything that will cause problems from stopping and starting.  She is not express any SI or HI and does not seem as depressed as she is anxious.  She will touch base with me next week and let me know how she is doing.  Side effects of both medications discussed.  I have directed her to go to the emergency room if she has worsening symptoms over the weekend.      Health Maintenance Due   Topic Date Due   • TDAP/TD VACCINES (1 -  Tdap) 11/19/1979   • HEPATITIS C SCREENING  04/18/2017   • COLONOSCOPY  04/18/2017   • ANNUAL PHYSICAL  07/15/2018   • LIPID PANEL  02/20/2020   • ZOSTER VACCINE (2 of 2) 03/11/2020              EMR Dragon/Transcription disclaimer:   Much of this encounter note is an electronic transcription/translation of spoken language to printed text. The electronic translation of spoken language may permit erroneous, or at times, nonsensical words or phrases to be inadvertently transcribed; Although I have reviewed the note for such errors, some may still exist.      Patient gave consent today for a telehealth video visit as following recommendations of our governor and CDC during the COVID-19 pandemic.    15 min was spent in discussion with pt and greater than 50% of that time was spent counseling.   Zoom platform used with good A/V quality.

## 2020-08-12 ENCOUNTER — TELEMEDICINE (OUTPATIENT)
Dept: FAMILY MEDICINE CLINIC | Facility: CLINIC | Age: 50
End: 2020-08-12

## 2020-08-12 DIAGNOSIS — F33.1 MODERATE EPISODE OF RECURRENT MAJOR DEPRESSIVE DISORDER (HCC): ICD-10-CM

## 2020-08-12 DIAGNOSIS — F41.1 GENERALIZED ANXIETY DISORDER: Primary | ICD-10-CM

## 2020-08-12 DIAGNOSIS — F51.04 PSYCHOPHYSIOLOGICAL INSOMNIA: ICD-10-CM

## 2020-08-12 DIAGNOSIS — Z00.00 ROUTINE GENERAL MEDICAL EXAMINATION AT A HEALTH CARE FACILITY: ICD-10-CM

## 2020-08-12 PROCEDURE — 99442 PR PHYS/QHP TELEPHONE EVALUATION 11-20 MIN: CPT | Performed by: NURSE PRACTITIONER

## 2020-08-12 RX ORDER — HYDROXYZINE HYDROCHLORIDE 10 MG/1
10 TABLET, FILM COATED ORAL 3 TIMES DAILY PRN
Qty: 30 TABLET | Refills: 0 | Status: SHIPPED | OUTPATIENT
Start: 2020-08-12 | End: 2020-10-03 | Stop reason: SDUPTHER

## 2020-08-12 NOTE — PROGRESS NOTES
Subjective   Janey Peña is a 49 y.o. female.     Chief Complaint   Patient presents with   • Anxiety    anxious and crying out of blue    HPI patient requested video visit for follow-up on anxiety and panic attacks.  She had a tele-visit on July 31 and at that time had restarted Effexor 75 mg and was not noticing any improvement in her symptoms.  She reports frequent panic attacks at work and at home.  She was having crying and some mood swings and and was not sure why she was having the symptoms.    Today she reports that she is no longer having panic attacks since starting the BuSpar 10 mg twice daily but she does not know that her anxiety is any better.  She has still continued to take the Effexor daily.  She takes it at night as it makes her sleepy.    He finds her self crying for no reason and is causing stress especially at work.  She has a new job working in a warehouse.  She does not feel suicidal or homicidal just feels like her emotions are very easily listed.  After she cries she feels like she gets a headache and then gets little bit nauseous.  She denies any vomiting.    She has chronic anxiety but does find that she is sleeping better since starting the Effexor.    She has not been to work in 2 days due to her anxiety symptoms and crying fits.    She has had a history of hysterectomy and no pregnancy risk.  Social History     Tobacco Use   • Smoking status: Never Smoker   • Smokeless tobacco: Never Used   Substance Use Topics   • Alcohol use: Yes     Alcohol/week: 7.0 standard drinks     Types: 7 Glasses of wine per week   • Drug use: No       The following portions of the patient's history were reviewed and updated as appropriate: allergies, current medications, past family history, past medical history, past social history, past surgical history and problem list.    Review of Systems   Constitutional: Positive for fatigue. Negative for activity change, appetite change and unexpected weight change.    Respiratory: Negative for cough and shortness of breath.    Cardiovascular: Negative for chest pain and palpitations.   Gastrointestinal: Negative for abdominal pain, constipation, diarrhea and vomiting.   Neurological: Positive for headaches. Negative for dizziness, weakness and light-headedness.       Objective   not currently breastfeeding.  There is no height or weight on file to calculate BMI.    Physical Exam   Psychiatric: She has a normal mood and affect. Her speech is normal and behavior is normal. Thought content normal. Cognition and memory are normal. She expresses no homicidal and no suicidal ideation. She expresses no suicidal plans and no homicidal plans.   She seemed open and forthcoming, no crying.  Her thoughts were organized and she was able to express her feelings thoughtfully     Limited by telephone visit.  She is well sounding and does not seem to be distressed.  She seems alert and oriented and her mood and affect are normal, good historian of medical history.  No cough or dyspnea appreciated, able to complete sentences without problem.       Assessment   Problem List Items Addressed This Visit        Other    Cannot sleep    Relevant Orders    TSH Rfx On Abnormal To Free T4      Other Visit Diagnoses     Generalized anxiety disorder    -  Primary    Relevant Medications    hydrOXYzine (ATARAX) 10 MG tablet    Other Relevant Orders    CBC & Differential    Comprehensive Metabolic Panel    TSH Rfx On Abnormal To Free T4    Moderate episode of recurrent major depressive disorder (CMS/HCC)        Relevant Medications    hydrOXYzine (ATARAX) 10 MG tablet    Other Relevant Orders    CBC & Differential    Comprehensive Metabolic Panel    TSH Rfx On Abnormal To Free T4    Routine general medical examination at a health care facility        Relevant Orders    CBC & Differential    Comprehensive Metabolic Panel    TSH Rfx On Abnormal To Free T4    Lipid Panel With LDL / HDL Ratio            Procedures           Impression and Plan: We will have her increase her Effexor to 150 mg daily.  I again cautioned her to not stop it cold turkey as she would have most likely some significant side effects.  We would need to wean off of this.  I want to continue the BuSpar twice per day.  I do not think adding a third dose in the middle of the day will be all that helpful.  We will also add hydroxyzine to take as needed.  We will start at 10 mg which should not make her so drowsy that she is not able to function.  I have asked her to start it over the weekend if needed for anxiety when she knows how it will affect her so she does not get drowsy at work.    For now she will take 2 of the 75 mg Effexor is that she has an follow-up with me next week.  If this seems to be improving her symptoms I will send a prescription.  She does not have any insurance currently and do not want to send any unnecessary expensive medications if we can avoid it.    Also discussed safety planning.  She does not seem to be a safety risk.  She expresses no SI or HI and has no past history or family history of either.  She does report that her daughter and mother live right next door and they are close and have been monitoring her since they know she has not been doing well.    She has not had a checkup in a while and not had any labs done.  I have put lab orders in and want her to come do those so we can make sure there is no organic cause for her new onset symptoms.  And I would like to follow-up in office for checkup after.    Health Maintenance Due   Topic Date Due   • TDAP/TD VACCINES (1 - Tdap) 11/19/1979   • HEPATITIS C SCREENING  04/18/2017   • PAP SMEAR  04/18/2017   • COLONOSCOPY  04/18/2017   • ANNUAL PHYSICAL  07/15/2018   • LIPID PANEL  02/20/2020   • ZOSTER VACCINE (2 of 2) 03/11/2020              EMR Dragon/Transcription disclaimer:   Much of this encounter note is an electronic transcription/translation of spoken language  to printed text. The electronic translation of spoken language may permit erroneous, or at times, nonsensical words or phrases to be inadvertently transcribed; Although I have reviewed the note for such errors, some may still exist.      Patient gave consent today for a telehealth video visit as following recommendations of our governor and CDC during the COVID-19 pandemic.    23 min was spent in discussion with pt and greater than 50% of that time was spent counseling.    Zoom platform used for telephone visit.  Pt was not able to get her camera connected.  We had good audio quality.

## 2020-08-12 NOTE — PATIENT INSTRUCTIONS
Generalized Anxiety Disorder, Adult  Generalized anxiety disorder (SANIA) is a mental health disorder. People with this condition constantly worry about everyday events. Unlike normal anxiety, worry related to SANIA is not triggered by a specific event. These worries also do not fade or get better with time. SANIA interferes with life functions, including relationships, work, and school.  SANIA can vary from mild to severe. People with severe SANIA can have intense waves of anxiety with physical symptoms (panic attacks).  What are the causes?  The exact cause of SANIA is not known.  What increases the risk?  This condition is more likely to develop in:  · Women.  · People who have a family history of anxiety disorders.  · People who are very shy.  · People who experience very stressful life events, such as the death of a loved one.  · People who have a very stressful family environment.  What are the signs or symptoms?  People with SANIA often worry excessively about many things in their lives, such as their health and family. They may also be overly concerned about:  · Doing well at work.  · Being on time.  · Natural disasters.  · Friendships.  Physical symptoms of SANIA include:  · Fatigue.  · Muscle tension or having muscle twitches.  · Trembling or feeling shaky.  · Being easily startled.  · Feeling like your heart is pounding or racing.  · Feeling out of breath or like you cannot take a deep breath.  · Having trouble falling asleep or staying asleep.  · Sweating.  · Nausea, diarrhea, or irritable bowel syndrome (IBS).  · Headaches.  · Trouble concentrating or remembering facts.  · Restlessness.  · Irritability.  How is this diagnosed?  Your health care provider can diagnose SANIA based on your symptoms and medical history. You will also have a physical exam. The health care provider will ask specific questions about your symptoms, including how severe they are, when they started, and if they come and go. Your health care  provider may ask you about your use of alcohol or drugs, including prescription medicines. Your health care provider may refer you to a mental health specialist for further evaluation.  Your health care provider will do a thorough examination and may perform additional tests to rule out other possible causes of your symptoms.  To be diagnosed with SANIA, a person must have anxiety that:  · Is out of his or her control.  · Affects several different aspects of his or her life, such as work and relationships.  · Causes distress that makes him or her unable to take part in normal activities.  · Includes at least three physical symptoms of SANIA, such as restlessness, fatigue, trouble concentrating, irritability, muscle tension, or sleep problems.  Before your health care provider can confirm a diagnosis of SANIA, these symptoms must be present more days than they are not, and they must last for six months or longer.  How is this treated?  The following therapies are usually used to treat SANIA:  · Medicine. Antidepressant medicine is usually prescribed for long-term daily control. Antianxiety medicines may be added in severe cases, especially when panic attacks occur.  · Talk therapy (psychotherapy). Certain types of talk therapy can be helpful in treating SANIA by providing support, education, and guidance. Options include:  ? Cognitive behavioral therapy (CBT). People learn coping skills and techniques to ease their anxiety. They learn to identify unrealistic or negative thoughts and behaviors and to replace them with positive ones.  ? Acceptance and commitment therapy (ACT). This treatment teaches people how to be mindful as a way to cope with unwanted thoughts and feelings.  ? Biofeedback. This process trains you to manage your body's response (physiological response) through breathing techniques and relaxation methods. You will work with a therapist while machines are used to monitor your physical symptoms.  · Stress  management techniques. These include yoga, meditation, and exercise.  A mental health specialist can help determine which treatment is best for you. Some people see improvement with one type of therapy. However, other people require a combination of therapies.  Follow these instructions at home:  · Take over-the-counter and prescription medicines only as told by your health care provider.  · Try to maintain a normal routine.  · Try to anticipate stressful situations and allow extra time to manage them.  · Practice any stress management or self-calming techniques as taught by your health care provider.  · Do not punish yourself for setbacks or for not making progress.  · Try to recognize your accomplishments, even if they are small.  · Keep all follow-up visits as told by your health care provider. This is important.  Contact a health care provider if:  · Your symptoms do not get better.  · Your symptoms get worse.  · You have signs of depression, such as:  ? A persistently sad, cranky, or irritable mood.  ? Loss of enjoyment in activities that used to bring you deandre.  ? Change in weight or eating.  ? Changes in sleeping habits.  ? Avoiding friends or family members.  ? Loss of energy for normal tasks.  ? Feelings of guilt or worthlessness.  Get help right away if:  · You have serious thoughts about hurting yourself or others.  If you ever feel like you may hurt yourself or others, or have thoughts about taking your own life, get help right away. You can go to your nearest emergency department or call:  · Your local emergency services (911 in the [place]  · A suicide crisis helpline, such as the National Suicide Prevention Lifeline at 1-163.351.8640. This is open 24 hours a day.  Summary  · Generalized anxiety disorder (SANIA) is a mental health disorder that involves worry that is not triggered by a specific event.  · People with SANIA often worry excessively about many things in their lives, such as their health and  family.  · SANIA may cause physical symptoms such as restlessness, trouble concentrating, sleep problems, frequent sweating, nausea, diarrhea, headaches, and trembling or muscle twitching.  · A mental health specialist can help determine which treatment is best for you. Some people see improvement with one type of therapy. However, other people require a combination of therapies.  This information is not intended to replace advice given to you by your health care provider. Make sure you discuss any questions you have with your health care provider.  Document Released: 04/14/2014 Document Revised: 11/30/2018 Document Reviewed: 11/07/2017  [employer] Patient Education © 2020 [employer]  Major Depressive Disorder, Adult  Major depressive disorder (MDD) is a mental health condition. MDD often makes you feel sad, hopeless, or helpless. MDD can also cause symptoms in your body. MDD can affect your:  · Work.  · School.  · Relationships.  · Other normal activities.  MDD can range from mild to very bad. It may occur once (single episode MDD). It can also occur many times (recurrent MDD).  The main symptoms of MDD often include:  · Feeling sad, depressed, or irritable most of the time.  · Loss of interest.  MDD symptoms also include:  · Sleeping too much or too little.  · Eating too much or too little.  · A change in your weight.  · Feeling tired (fatigue) or having low energy.  · Feeling worthless.  · Feeling guilty.  · Trouble making decisions.  · Trouble thinking clearly.  · Thoughts of suicide or harming others.  · Feeling weak.  · Feeling agitated.  · Keeping yourself from being around other people (isolation).  Follow these instructions at home:  Activity  · Do these things as told by your doctor:  ? Go back to your normal activities.  ? Exercise regularly.  ? Spend time outdoors.  Alcohol  · Talk with your doctor about how alcohol can affect your antidepressant medicines.  · Do not drink alcohol. Or, limit how much  alcohol you drink.  ? This means no more than 1 drink a day for nonpregnant women and 2 drinks a day for men. One drink equals one of these:  § 12 oz of beer.  § 5 oz of wine.  § 1½ oz of hard liquor.  General instructions  · Take over-the-counter and prescription medicines only as told by your doctor.  · Eat a healthy diet.  · Get plenty of sleep.  · Find activities that you enjoy. Make time to do them.  · Think about joining a support group. Your doctor may be able to suggest a group for you.  · Keep all follow-up visits as told by your doctor. This is important.  Where to find more information:  · National [place] on Mental Illness:  ? www.[facility] manuel.org  · [place] National [place] of Mental Health:  ? www.[facility].nih.gov  · National Suicide Prevention Lifeline:  ? 1-483.683.7929. This is free, 24-hour help.  Contact a doctor if:  · Your symptoms get worse.  · You have new symptoms.  Get help right away if:  · You self-harm.  · You see, hear, taste, smell, or feel things that are not present (hallucinate).  If you ever feel like you may hurt yourself or others, or have thoughts about taking your own life, get help right away. You can go to your nearest emergency department or call:  · Your local emergency services (911 in the [place]  · A suicide crisis helpline, such as the National Suicide Prevention Lifeline:  ? 1-260.664.6970. This is open 24 hours a day.  This information is not intended to replace advice given to you by your health care provider. Make sure you discuss any questions you have with your health care provider.  Document Released: 11/28/2016 Document Revised: 11/30/2018 Document Reviewed: 09/03/2017  [employer] Patient Education © 2020 [employer]

## 2020-08-19 ENCOUNTER — RESULTS ENCOUNTER (OUTPATIENT)
Dept: FAMILY MEDICINE CLINIC | Facility: CLINIC | Age: 50
End: 2020-08-19

## 2020-08-19 DIAGNOSIS — Z00.00 ROUTINE GENERAL MEDICAL EXAMINATION AT A HEALTH CARE FACILITY: ICD-10-CM

## 2020-08-19 DIAGNOSIS — F41.1 GENERALIZED ANXIETY DISORDER: ICD-10-CM

## 2020-08-19 DIAGNOSIS — F33.1 MODERATE EPISODE OF RECURRENT MAJOR DEPRESSIVE DISORDER (HCC): ICD-10-CM

## 2020-08-31 RX ORDER — BUSPIRONE HYDROCHLORIDE 10 MG/1
10 TABLET ORAL 2 TIMES DAILY
Qty: 60 TABLET | Refills: 1 | Status: SHIPPED | OUTPATIENT
Start: 2020-08-31 | End: 2020-12-14 | Stop reason: SDUPTHER

## 2020-09-10 DIAGNOSIS — F41.1 GENERALIZED ANXIETY DISORDER: Primary | ICD-10-CM

## 2020-09-10 DIAGNOSIS — F33.1 MODERATE EPISODE OF RECURRENT MAJOR DEPRESSIVE DISORDER (HCC): ICD-10-CM

## 2020-09-10 RX ORDER — VENLAFAXINE HYDROCHLORIDE 75 MG/1
75 CAPSULE, EXTENDED RELEASE ORAL DAILY
Qty: 30 CAPSULE | Refills: 2 | OUTPATIENT
Start: 2020-09-10

## 2020-09-10 RX ORDER — VENLAFAXINE HYDROCHLORIDE 150 MG/1
150 CAPSULE, EXTENDED RELEASE ORAL DAILY
Qty: 30 CAPSULE | Refills: 3 | Status: SHIPPED | OUTPATIENT
Start: 2020-09-10 | End: 2020-12-14 | Stop reason: SDUPTHER

## 2020-10-03 DIAGNOSIS — F41.1 GENERALIZED ANXIETY DISORDER: ICD-10-CM

## 2020-10-05 RX ORDER — HYDROXYZINE HYDROCHLORIDE 10 MG/1
10 TABLET, FILM COATED ORAL 3 TIMES DAILY PRN
Qty: 90 TABLET | Refills: 0 | Status: SHIPPED | OUTPATIENT
Start: 2020-10-05 | End: 2020-12-12 | Stop reason: SDUPTHER

## 2020-12-12 ENCOUNTER — PATIENT MESSAGE (OUTPATIENT)
Dept: FAMILY MEDICINE CLINIC | Facility: CLINIC | Age: 50
End: 2020-12-12

## 2020-12-12 DIAGNOSIS — F33.1 MODERATE EPISODE OF RECURRENT MAJOR DEPRESSIVE DISORDER (HCC): ICD-10-CM

## 2020-12-12 DIAGNOSIS — F41.1 GENERALIZED ANXIETY DISORDER: ICD-10-CM

## 2020-12-15 RX ORDER — HYDROXYZINE HYDROCHLORIDE 10 MG/1
10 TABLET, FILM COATED ORAL 3 TIMES DAILY PRN
Qty: 90 TABLET | Refills: 2 | Status: SHIPPED | OUTPATIENT
Start: 2020-12-15 | End: 2021-11-04

## 2020-12-15 RX ORDER — VENLAFAXINE HYDROCHLORIDE 150 MG/1
150 CAPSULE, EXTENDED RELEASE ORAL DAILY
Qty: 30 CAPSULE | Refills: 3 | Status: SHIPPED | OUTPATIENT
Start: 2020-12-15 | End: 2021-11-04

## 2020-12-15 RX ORDER — BUSPIRONE HYDROCHLORIDE 10 MG/1
10 TABLET ORAL 2 TIMES DAILY
Qty: 60 TABLET | Refills: 3 | Status: SHIPPED | OUTPATIENT
Start: 2020-12-15 | End: 2021-11-04

## 2021-02-05 DIAGNOSIS — F33.1 MODERATE EPISODE OF RECURRENT MAJOR DEPRESSIVE DISORDER (HCC): ICD-10-CM

## 2021-02-05 DIAGNOSIS — F41.1 GENERALIZED ANXIETY DISORDER: ICD-10-CM

## 2021-02-08 RX ORDER — HYDROXYZINE HYDROCHLORIDE 10 MG/1
10 TABLET, FILM COATED ORAL 3 TIMES DAILY PRN
Qty: 90 TABLET | Refills: 2 | OUTPATIENT
Start: 2021-02-08

## 2021-02-08 RX ORDER — VENLAFAXINE HYDROCHLORIDE 150 MG/1
150 CAPSULE, EXTENDED RELEASE ORAL DAILY
Qty: 30 CAPSULE | Refills: 3 | OUTPATIENT
Start: 2021-02-08

## 2021-02-08 RX ORDER — BUSPIRONE HYDROCHLORIDE 10 MG/1
10 TABLET ORAL 2 TIMES DAILY
Qty: 60 TABLET | Refills: 3 | OUTPATIENT
Start: 2021-02-08

## 2021-03-24 ENCOUNTER — BULK ORDERING (OUTPATIENT)
Dept: CASE MANAGEMENT | Facility: OTHER | Age: 51
End: 2021-03-24

## 2021-03-24 DIAGNOSIS — Z23 IMMUNIZATION DUE: ICD-10-CM

## 2021-11-04 ENCOUNTER — OFFICE VISIT (OUTPATIENT)
Dept: FAMILY MEDICINE CLINIC | Facility: CLINIC | Age: 51
End: 2021-11-04

## 2021-11-04 VITALS
HEIGHT: 63 IN | DIASTOLIC BLOOD PRESSURE: 83 MMHG | HEART RATE: 62 BPM | BODY MASS INDEX: 35.67 KG/M2 | SYSTOLIC BLOOD PRESSURE: 140 MMHG | OXYGEN SATURATION: 98 % | WEIGHT: 201.3 LBS | TEMPERATURE: 97.3 F

## 2021-11-04 DIAGNOSIS — M79.671 FOOT PAIN, BILATERAL: ICD-10-CM

## 2021-11-04 DIAGNOSIS — M79.672 FOOT PAIN, BILATERAL: ICD-10-CM

## 2021-11-04 DIAGNOSIS — Z00.00 ANNUAL PHYSICAL EXAM: Primary | ICD-10-CM

## 2021-11-04 DIAGNOSIS — R63.5 WEIGHT GAIN: ICD-10-CM

## 2021-11-04 DIAGNOSIS — Z12.11 SCREEN FOR COLON CANCER: ICD-10-CM

## 2021-11-04 DIAGNOSIS — M25.50 POLYARTHRALGIA: ICD-10-CM

## 2021-11-04 PROCEDURE — 99396 PREV VISIT EST AGE 40-64: CPT | Performed by: NURSE PRACTITIONER

## 2021-11-04 PROCEDURE — 99214 OFFICE O/P EST MOD 30 MIN: CPT | Performed by: NURSE PRACTITIONER

## 2021-11-04 NOTE — PROGRESS NOTES
Chief Complaint  Annual Exam    Subjective          Janey Peña presents to [facility]  History of Present Illness here for physical and  B/l hip joint pain and chronic bilateral foot pain caused by plantar fasciitis.    Had moved to [place] and now has returned to home.  Got  in June.  Seems to be doing very well.    Has gained quite a bit of weight since last visit and not sure why she is gaining so much weight.  Follows a pretty healthy diet and is as active as she can be.  Sleeps well.  Anxiety and depression seem to be pretty well controlled currently.    She has had chronic bilateral foot pain for many years.  She has been treated for plantar fasciitis without much relief.  It keeps her from being able to walk or exercise much.  Every step causes pain.  She has pain after she has been sitting at her desk for a while and begins walking and for steps in the morning especially.  Has used inserts and stretching and a roller ball over many years without any relief.  She is now experiencing bilateral hip pain.  She feels like the pain is in her joint.  It hurts to lay down but not with lateral palpation.  She thinks part of the problem may be changes in her gait due to foot pain.  She also has some right shoulder pain after having rotator cuff surgery in the past.  Left shoulder sometimes is achy.  Hands are not especially achy.    She has noticed that her legs get restless prior to sleep.  She notices time to go to sleep when she gets restless in her legs and cannot hold them still.  If she goes to sleep immediately when she has onset of symptoms she is able to fall asleep however if she waits too long she is not able to fall asleep due to restless legs.  Never had any medication for this and has been ongoing for about 4 years.    No first degree relative with colon cancer.  Would like [facility] for initial colon cancer screening.    She has a family history of breast cancer  "and not had a mammo in a couple years.  [facility].  She will schedule with GYN for Pap as she has not had one in a couple years.    Does not go to the dentist often as she has to have gas in order to be examined and have her teeth cleaned.  Has quite a bit of anxiety related to the dentist.    Denies:  fever, chills, weakness, rash, joint swelling.  No HA, chest pain, palpitations, cough, dyspnea, trouble swallowing, sore throat, ear or nose problems.  No abd pain, n/v/d/constipation, melena, reflux.  No urinary problems, hematuria.  Denies anxiety, depression, insomnia.     She does have a family history of rheumatoid arthritis and other autoimmune disease.      Objective   Vital Signs:   /83   Pulse 62   Temp 97.3 °F (36.3 °C)   Ht 160 cm (63\")   Wt 91.3 kg (201 lb 4.8 oz)   SpO2 98%   BMI 35.66 kg/m²     Physical Exam  Vitals and nursing note reviewed.   Constitutional:       General: She is not in acute distress.     Appearance: She is well-developed. She is obese. She is not ill-appearing.   HENT:      Head: Normocephalic and atraumatic.      Right Ear: Tympanic membrane, ear canal and external ear normal.      Left Ear: Tympanic membrane, ear canal and external ear normal.      Mouth/Throat:      Mouth: Mucous membranes are moist.      Pharynx: Uvula midline. No posterior oropharyngeal erythema.   Eyes:      General: No scleral icterus.        Right eye: No discharge.         Left eye: No discharge.      Extraocular Movements: Extraocular movements intact.      Conjunctiva/sclera: Conjunctivae normal.      Pupils: Pupils are equal, round, and reactive to light.   Neck:      Thyroid: No thyromegaly.   Cardiovascular:      Rate and Rhythm: Normal rate and regular rhythm.      Heart sounds: Normal heart sounds. No murmur heard.      Pulmonary:      Effort: Pulmonary effort is normal.      Breath sounds: Normal " breath sounds.   Abdominal:      General: Bowel sounds are normal.      Palpations: Abdomen is soft.      Tenderness: There is no abdominal tenderness.   Musculoskeletal:         General: No deformity.      Cervical back: Neck supple.      Right hip: Normal.      Left hip: Normal.      Right foot: Normal range of motion. No deformity.      Left foot: Normal range of motion. No deformity.      Comments: Gait smooth and steady   Feet:      Right foot:      Skin integrity: Skin integrity normal.      Left foot:      Skin integrity: Skin integrity normal.   Lymphadenopathy:      Cervical: No cervical adenopathy.   Skin:     General: Skin is warm and dry.   Neurological:      General: No focal deficit present.      Mental Status: She is alert and oriented to person, place, and time.      Cranial Nerves: No cranial nerve deficit.   Psychiatric:         Mood and Affect: Mood normal.         Behavior: Behavior normal.        Result Review :                 Assessment and Plan    Diagnoses and all orders for this visit:    1. Annual physical exam (Primary)  -     CBC & Differential  -     Comprehensive Metabolic Panel  -     C-reactive Protein  -     Hemoglobin A1c  -     Lipid Panel With LDL / HDL Ratio  -     TSH  -     T3  -     T4, Free  -     Thyroid Peroxidase Antibody    2. Foot pain, bilateral  -     Ambulatory Referral to Podiatry  -     Rheumatoid Factor, Quant  -     Cyclic Citrul Peptide Antibody, IgG / IgA    3. Polyarthralgia  -     Rheumatoid Factor, Quant  -     Cyclic Citrul Peptide Antibody, IgG / IgA    4. Weight gain  -     Hemoglobin A1c  -     TSH  -     T3  -     T4, Free  -     Thyroid Peroxidase Antibody    5. Screen for colon cancer  -     Cologuard - Stool, Per Rectum; Future       As part of wellness and prevention, the following topics were discussed with the patient:   Nutrition-diet high in fresh/fozen veges, lower in carbs, unsaturated fats, and higher in lean proteins, adequate hydration    Physical activity/regular exercise-150 mins per week of moderate activity that increases HR and is enjoyable to lower stress and improve CVD     Healthy weight-above goal BMI discussed    Injury prevention-back and joint health    Substance misuse/abuse-none identified   Dental health-recommend twice per year dental cleans and daily flossing to lower inflammation in body   Mental health-stress mgmt and sleep hygiene   Immunization-recommend covid, flu, shingles vaccines   Pt to schedule mammo-high risk  Pt to schedule for pap and GYN  cologuard ordered-pt aware if positive will need c scope.    Bilateral foot pain: I am not so sure that this is plantar fasciitis and have referred her to podiatry.  I suspect she may have other orthotic disease underlying.  Altered gait may be causing some of her hip pain.  Hip exam was pretty normal.  In the meantime management of plantar fasciitis discussed.    We will check lab work for rheumatoid although I think this is unlikely but may be other arthritic condition which we discussed.    She has gained a significant amount of weight which may be causing some of her increased joint pain which we discussed.  I am going to do some extra thyroid studies due to large weight gain.  We will also screen for diabetes as she has diabetes in her family and she has gained weight.    Follow-up pending lab results.  I suspect she probably has restless legs and will check ferritin and iron studies.        Follow Up   Return if symptoms worsen or fail to improve.  Patient was given instructions and counseling regarding her condition or for health maintenance advice. Please see specific information pulled into the AVS if appropriate.

## 2021-11-06 LAB
ALBUMIN SERPL-MCNC: 4.6 G/DL (ref 3.8–4.9)
ALBUMIN/GLOB SERPL: 1.8 {RATIO} (ref 1.2–2.2)
ALP SERPL-CCNC: 66 IU/L (ref 44–121)
ALT SERPL-CCNC: 18 IU/L (ref 0–32)
AST SERPL-CCNC: 16 IU/L (ref 0–40)
BASOPHILS # BLD AUTO: 0.1 X10E3/UL (ref 0–0.2)
BASOPHILS NFR BLD AUTO: 2 %
BILIRUB SERPL-MCNC: 0.5 MG/DL (ref 0–1.2)
BUN SERPL-MCNC: 9 MG/DL (ref 6–24)
BUN/CREAT SERPL: 10 (ref 9–23)
CALCIUM SERPL-MCNC: 9.8 MG/DL (ref 8.7–10.2)
CCP IGA+IGG SERPL IA-ACNC: 8 UNITS (ref 0–19)
CHLORIDE SERPL-SCNC: 105 MMOL/L (ref 96–106)
CHOLEST SERPL-MCNC: 168 MG/DL (ref 100–199)
CO2 SERPL-SCNC: 23 MMOL/L (ref 20–29)
CREAT SERPL-MCNC: 0.94 MG/DL (ref 0.57–1)
CRP SERPL-MCNC: 15 MG/L (ref 0–10)
EOSINOPHIL # BLD AUTO: 0.3 X10E3/UL (ref 0–0.4)
EOSINOPHIL NFR BLD AUTO: 4 %
ERYTHROCYTE [DISTWIDTH] IN BLOOD BY AUTOMATED COUNT: 12.7 % (ref 11.7–15.4)
GLOBULIN SER CALC-MCNC: 2.5 G/DL (ref 1.5–4.5)
GLUCOSE SERPL-MCNC: 91 MG/DL (ref 65–99)
HBA1C MFR BLD: 5.4 % (ref 4.8–5.6)
HCT VFR BLD AUTO: 42.9 % (ref 34–46.6)
HDLC SERPL-MCNC: 55 MG/DL
HGB BLD-MCNC: 14.5 G/DL (ref 11.1–15.9)
IMM GRANULOCYTES # BLD AUTO: 0 X10E3/UL (ref 0–0.1)
IMM GRANULOCYTES NFR BLD AUTO: 0 %
LDLC SERPL CALC-MCNC: 100 MG/DL (ref 0–99)
LDLC/HDLC SERPL: 1.8 RATIO (ref 0–3.2)
LYMPHOCYTES # BLD AUTO: 1.9 X10E3/UL (ref 0.7–3.1)
LYMPHOCYTES NFR BLD AUTO: 25 %
MCH RBC QN AUTO: 30.1 PG (ref 26.6–33)
MCHC RBC AUTO-ENTMCNC: 33.8 G/DL (ref 31.5–35.7)
MCV RBC AUTO: 89 FL (ref 79–97)
MONOCYTES # BLD AUTO: 0.7 X10E3/UL (ref 0.1–0.9)
MONOCYTES NFR BLD AUTO: 9 %
NEUTROPHILS # BLD AUTO: 4.6 X10E3/UL (ref 1.4–7)
NEUTROPHILS NFR BLD AUTO: 60 %
PLATELET # BLD AUTO: 264 X10E3/UL (ref 150–450)
POTASSIUM SERPL-SCNC: 4.7 MMOL/L (ref 3.5–5.2)
PROT SERPL-MCNC: 7.1 G/DL (ref 6–8.5)
RBC # BLD AUTO: 4.82 X10E6/UL (ref 3.77–5.28)
RHEUMATOID FACT SERPL-ACNC: <10 IU/ML (ref 0–13.9)
SODIUM SERPL-SCNC: 141 MMOL/L (ref 134–144)
T3 SERPL-MCNC: 124 NG/DL (ref 71–180)
T4 FREE SERPL-MCNC: 1.02 NG/DL (ref 0.82–1.77)
THYROPEROXIDASE AB SERPL-ACNC: 9 IU/ML (ref 0–34)
TRIGL SERPL-MCNC: 70 MG/DL (ref 0–149)
TSH SERPL DL<=0.005 MIU/L-ACNC: 1.82 UIU/ML (ref 0.45–4.5)
VLDLC SERPL CALC-MCNC: 13 MG/DL (ref 5–40)
WBC # BLD AUTO: 7.7 X10E3/UL (ref 3.4–10.8)

## 2021-11-08 ENCOUNTER — TELEPHONE (OUTPATIENT)
Dept: FAMILY MEDICINE CLINIC | Facility: CLINIC | Age: 51
End: 2021-11-08

## 2022-02-18 ENCOUNTER — OFFICE VISIT (OUTPATIENT)
Dept: FAMILY MEDICINE CLINIC | Facility: CLINIC | Age: 52
End: 2022-02-18

## 2022-02-18 ENCOUNTER — OFFICE VISIT (OUTPATIENT)
Dept: SPORTS MEDICINE | Facility: CLINIC | Age: 52
End: 2022-02-18

## 2022-02-18 VITALS
OXYGEN SATURATION: 100 % | BODY MASS INDEX: 34.73 KG/M2 | DIASTOLIC BLOOD PRESSURE: 85 MMHG | WEIGHT: 196 LBS | HEIGHT: 63 IN | SYSTOLIC BLOOD PRESSURE: 168 MMHG | HEART RATE: 70 BPM

## 2022-02-18 VITALS
HEART RATE: 67 BPM | HEIGHT: 63 IN | TEMPERATURE: 97.5 F | WEIGHT: 196 LBS | BODY MASS INDEX: 34.73 KG/M2 | RESPIRATION RATE: 16 BRPM | DIASTOLIC BLOOD PRESSURE: 70 MMHG | SYSTOLIC BLOOD PRESSURE: 112 MMHG | OXYGEN SATURATION: 98 %

## 2022-02-18 DIAGNOSIS — M25.512 ACUTE PAIN OF LEFT SHOULDER: Primary | ICD-10-CM

## 2022-02-18 DIAGNOSIS — M25.512 CHRONIC LEFT SHOULDER PAIN: Primary | ICD-10-CM

## 2022-02-18 DIAGNOSIS — G89.29 CHRONIC LEFT SHOULDER PAIN: Primary | ICD-10-CM

## 2022-02-18 DIAGNOSIS — M75.32 CALCIFIC TENDINITIS OF LEFT SHOULDER: ICD-10-CM

## 2022-02-18 PROCEDURE — 99213 OFFICE O/P EST LOW 20 MIN: CPT | Performed by: NURSE PRACTITIONER

## 2022-02-18 PROCEDURE — 73030 X-RAY EXAM OF SHOULDER: CPT | Performed by: FAMILY MEDICINE

## 2022-02-18 PROCEDURE — 99203 OFFICE O/P NEW LOW 30 MIN: CPT | Performed by: FAMILY MEDICINE

## 2022-02-18 RX ORDER — CYCLOBENZAPRINE HCL 10 MG
10 TABLET ORAL NIGHTLY PRN
Qty: 30 TABLET | Refills: 0 | Status: SHIPPED | OUTPATIENT
Start: 2022-02-18 | End: 2022-03-31 | Stop reason: SDUPTHER

## 2022-02-18 RX ORDER — IBUPROFEN 200 MG
600 TABLET ORAL EVERY 4 HOURS
COMMUNITY

## 2022-02-18 NOTE — PROGRESS NOTES
"Chief Complaint  Shoulder Pain (C/o pain in left shoulder x 8 months. Mostly w/ movement and sleeping. goes down upper arm and into hand)    Subjective          Janey Peña presents to [facility]  History of Present Illness Pt here for left shoulder pain, decreased ROM, worse at night, not able to sleep, no trauma, cannot get dressed, taking ibuprofen and not helpful.  Topicals not helpful and now skin is burning.  She has used heat-not helpful. Needs to be able to raise her arm laterally at work and has trouble now.  She is also now having some radiation of the pain down into bicep and radiculopathy in wrist intermittently.      Previous right shoulder replacement.  Had right steroid injection which caused more pain than injury but that was appr 30 yrs ago.       Objective   Vital Signs:   /85   Pulse 70   Ht 160 cm (63\")   Wt 88.9 kg (196 lb)   SpO2 100%   BMI 34.72 kg/m²     Physical Exam  Vitals and nursing note reviewed.   Constitutional:       General: She is not in acute distress.     Appearance: She is well-developed. She is not ill-appearing or diaphoretic.   HENT:      Head: Normocephalic and atraumatic.   Eyes:      General:         Right eye: No discharge.         Left eye: No discharge.      Conjunctiva/sclera: Conjunctivae normal.   Pulmonary:      Effort: Pulmonary effort is normal.   Musculoskeletal:         General: No deformity.      Left shoulder: Tenderness present. No deformity or bony tenderness. Decreased range of motion.      Cervical back: Neck supple.   Skin:     General: Skin is warm and dry.   Neurological:      Mental Status: She is alert and oriented to person, place, and time.   Psychiatric:         Mood and Affect: Mood normal.         Behavior: Behavior normal.        Result Review :                 Assessment and Plan    Diagnoses and all orders for this visit:    1. Acute pain of left shoulder (Primary)  -     cyclobenzaprine (FLEXERIL) 10 " MG tablet; Take 1 tablet by mouth At Night As Needed for Muscle Spasms (shoulder pain).  Dispense: 30 tablet; Refill: 0  -     Ambulatory Referral to Sports Medicine      Left shoulder pain likely impingement.  Referral to sports medicine.  Pt is hesitant for injection if needed, but we discussed that there is a big difference between therapies now and 30 yrs ago.  In the meantime she can try Ibuprofen 600-800 mg TID with food and to prior to bed.  ICE x 30 mins TID.  Will give topical from compounding pharmacy prn.  Flexeril at HS to help with pain and sleep.              Follow Up   No follow-ups on file.  Patient was given instructions and counseling regarding her condition or for health maintenance advice. Please see specific information pulled into the AVS if appropriate.       Answers for HPI/ROS submitted by the patient on 2/15/2022  Please describe your symptoms.: Extreme pain in left shoulder ,painful to move arm or lift it. Can't lay on it. Been going on since about June. Just getting worse.  Very little mobility .  Have you had these symptoms before?: No  How long have you been having these symptoms?: Greater than 2 weeks  Please list any medications you are currently taking for this condition.: Ibuprofen  Please describe any probable cause for these symptoms. : Heavy lifting on a normal basis.  What is the primary reason for your visit?: Other

## 2022-02-18 NOTE — PROGRESS NOTES
"Janey is a 51 y.o. year old female presents to [facility]    Chief Complaint   Patient presents with   • Shoulder Pain     Referral from KISHOR Schmitz for eval of LT shoulder pain with NKI - pain x 8 months, reports having a previous repetitive job with lifting, pushing, pulling but not currently working that job - has some tingling at times, but no other sxs - here with new x-rays for further evaluation and treatment        History of Present Illness  Seen at the request of Anaya Cabezas. NKI. RHD. H/o R RTC repair, had CSI before that \"hurt more than the surgery did.\" Has been taking IBU regularly. + night time pain. Able to fxn at work. Has been limiting arm motion d/t pain. Refuses CSI today.    Review of systems negative except for following: MSK positive for shoulder pain.    I have reviewed the patient's medical, family, and social history in detail and updated the computerized patient record.    /70 (BP Location: Left arm, Patient Position: Sitting, Cuff Size: Adult)   Pulse 67   Temp 97.5 °F (36.4 °C) (Temporal)   Resp 16   Ht 160 cm (62.99\")   Wt 88.9 kg (196 lb)   SpO2 98%   BMI 34.73 kg/m²      Physical Exam    Mask worn thru encounter  Vital signs reviewed.   General: No acute distress.  Eyes: conjunctiva clear; pupils equally round and reactive  ENT: external ears atraumatic  CV: no peripheral edema  Resp: normal respiratory effort, no use of accessory muscles  Skin: no rashes or wounds; normal turgor  Psych: mood and affect appropriate; recent and remote memory intact  Neuro: sensation to light touch intact    MSK Exam  L shoulder: Full range of motion.  Negative drop arm.  Negative empty can.  Positive Daniels, positive Neer sign.  Positive scarf sign.  Negative Yergason and Speed.    Left Shoulder X-Ray  Indication: Pain  Views: AP internal and external    Findings:  No fracture  No bony lesion  Calcific tendinitis of supraspinatus " tendon  Normal joint spaces    No prior studies were available for comparison.         Diagnoses and all orders for this visit:    Chronic left shoulder pain  -     XR Shoulder 2+ View Left  -     Ambulatory Referral to Physical Therapy    Calcific tendinitis of left shoulder  -     Ambulatory Referral to Physical Therapy    Discussed diagnosis of calcific tendinitis.  Offered cortisone injection due to severity of her pain but she declined.  She states that she was prescribed compound cream.  I also recommend physical therapy.  Follow-up as needed.      Follow Up   Return if symptoms worsen or fail to improve.  Patient was given instructions and counseling regarding her condition or for health maintenance advice. Please see specific information pulled into the AVS if appropriate.     EMR Dragon/Transcription disclaimer:    Much of this encounter note is an electronic transcription/translation of spoken language to printed text.  The electronic translation of spoken language may permit erroneous, or at times, nonsensical words or phrases to be inadvertently transcribed.  Although I have reviewed the note for such errors some may still exist.

## 2022-03-31 DIAGNOSIS — M25.512 ACUTE PAIN OF LEFT SHOULDER: ICD-10-CM

## 2022-03-31 RX ORDER — CYCLOBENZAPRINE HCL 10 MG
10 TABLET ORAL NIGHTLY PRN
Qty: 30 TABLET | Refills: 0 | Status: SHIPPED | OUTPATIENT
Start: 2022-03-31 | End: 2023-02-14

## 2023-02-14 ENCOUNTER — OFFICE VISIT (OUTPATIENT)
Dept: FAMILY MEDICINE CLINIC | Facility: CLINIC | Age: 53
End: 2023-02-14
Payer: COMMERCIAL

## 2023-02-14 VITALS
WEIGHT: 199 LBS | TEMPERATURE: 97.7 F | HEIGHT: 63 IN | OXYGEN SATURATION: 98 % | DIASTOLIC BLOOD PRESSURE: 91 MMHG | SYSTOLIC BLOOD PRESSURE: 134 MMHG | HEART RATE: 89 BPM | BODY MASS INDEX: 35.26 KG/M2

## 2023-02-14 DIAGNOSIS — F51.04 PSYCHOPHYSIOLOGICAL INSOMNIA: ICD-10-CM

## 2023-02-14 DIAGNOSIS — J40 BRONCHITIS: Primary | ICD-10-CM

## 2023-02-14 LAB
EXPIRATION DATE: NORMAL
EXPIRATION DATE: NORMAL
FLUAV AG UPPER RESP QL IA.RAPID: NOT DETECTED
FLUBV AG UPPER RESP QL IA.RAPID: NOT DETECTED
INTERNAL CONTROL: NORMAL
INTERNAL CONTROL: NORMAL
Lab: NORMAL
Lab: NORMAL
S PYO AG THROAT QL: NEGATIVE
SARS-COV-2 AG UPPER RESP QL IA.RAPID: NOT DETECTED

## 2023-02-14 PROCEDURE — 87428 SARSCOV & INF VIR A&B AG IA: CPT | Performed by: NURSE PRACTITIONER

## 2023-02-14 PROCEDURE — 99213 OFFICE O/P EST LOW 20 MIN: CPT | Performed by: NURSE PRACTITIONER

## 2023-02-14 PROCEDURE — 87880 STREP A ASSAY W/OPTIC: CPT | Performed by: NURSE PRACTITIONER

## 2023-02-14 RX ORDER — ALBUTEROL SULFATE 90 UG/1
2 AEROSOL, METERED RESPIRATORY (INHALATION) EVERY 4 HOURS PRN
Qty: 18 G | Refills: 0 | Status: SHIPPED | OUTPATIENT
Start: 2023-02-14

## 2023-02-14 RX ORDER — BENZONATATE 100 MG/1
100 CAPSULE ORAL 3 TIMES DAILY PRN
Qty: 30 CAPSULE | Refills: 0 | Status: SHIPPED | OUTPATIENT
Start: 2023-02-14 | End: 2023-03-14 | Stop reason: ALTCHOICE

## 2023-02-14 RX ORDER — HYDROXYZINE HYDROCHLORIDE 25 MG/1
25 TABLET, FILM COATED ORAL NIGHTLY PRN
Qty: 30 TABLET | Refills: 2 | Status: SHIPPED | OUTPATIENT
Start: 2023-02-14

## 2023-02-14 NOTE — PROGRESS NOTES
"Chief Complaint  Cough (C/o cough, mild sore throat, bodyahces, nasal drainage, sinus pressure, fever Sunday, ) and Fatigue (C/o fatigue has been an issue for about a year)    Subjective        Janey Peña presents to [facility]  History of Present Illness     Janey Allred is a 51-year-old female who presents today for a sick visit.    The patient reports she has been sick since 02/12/2023. She mentions she has no energy, she can not think straight, and she feels out of breath sometimes. The patient reports she has shoulder and hip pain. The patient reports she sounds like she is depressed, but she is mentally in a good place. She notes she uses marijuana gummies most nights to sleep . The patient reports she has not smoked marijuana since 02/12/2023.     The patient reports she was wheezing on 02/12/2023. She mentions she got up and went out to breakfast, and she was really exhausted. The patient reports by the time she got done, her throat was scratchy and burning like strep throat. She mentions she did have white spots in it. The patient reports she thought it was strep throat because she went around her daughter's house. She did not see any white spots earlier. The patient reports her throat is not hurting anymore.  Is coughing alot, . She mentions she felt fine on 02/12/2023. The patient reports her  told her she had a fever on 02/12/2023.    She had previously been on hydroxyzine for sleep and anxiety and is requesting a refill on that  prior to getting physical.    Objective   Vital Signs:  /91   Pulse 89   Temp 97.7 °F (36.5 °C)   Ht 160 cm (63\")   Wt 90.3 kg (199 lb)   SpO2 98%   BMI 35.25 kg/m²   Estimated body mass index is 35.25 kg/m² as calculated from the following:    Height as of this encounter: 160 cm (63\").    Weight as of this encounter: 90.3 kg (199 lb).        A review of systems was performed, and the pertinent positives are noted in the " HPI.      Class 2 Severe Obesity (BMI >=35 and <=39.9). Obesity-related health conditions include the following: none. Obesity is unchanged. BMI is is above average; BMI management plan is completed. We discussed portion control and increasing exercise.      Physical Exam  Vitals and nursing note reviewed.   Constitutional:       General: She is not in acute distress.     Appearance: She is well-developed. She is ill-appearing (Mildly ).   HENT:      Head: Normocephalic and atraumatic.      Right Ear: Tympanic membrane, ear canal and external ear normal.      Left Ear: Tympanic membrane, ear canal and external ear normal.      Mouth/Throat:      Mouth: Mucous membranes are moist.      Pharynx: Uvula midline. Posterior oropharyngeal erythema present. No oropharyngeal exudate.   Eyes:      General: No scleral icterus.        Right eye: No discharge.         Left eye: No discharge.      Conjunctiva/sclera: Conjunctivae normal.   Neck:      Thyroid: No thyromegaly.   Cardiovascular:      Rate and Rhythm: Normal rate and regular rhythm.      Heart sounds: Normal heart sounds. No murmur heard.  Pulmonary:      Effort: Pulmonary effort is normal.      Breath sounds: Wheezing present. No rhonchi or rales.      Comments: Moist cough  Few scattered expiratory wheezes  Abdominal:      General: Bowel sounds are normal.      Palpations: Abdomen is soft.      Tenderness: There is no abdominal tenderness.   Musculoskeletal:         General: No deformity.      Cervical back: Neck supple.      Comments: Gait smooth and steady   Lymphadenopathy:      Cervical: No cervical adenopathy.   Skin:     General: Skin is warm and dry.   Neurological:      General: No focal deficit present.      Mental Status: She is alert and oriented to person, place, and time.   Psychiatric:         Mood and Affect: Mood normal.         Behavior: Behavior normal.        Result Review :                   Assessment and Plan   Diagnoses and all orders for  this visit:    1. Bronchitis (Primary)  -     benzonatate (Tessalon Perles) 100 MG capsule; Take 1 capsule by mouth 3 (Three) Times a Day As Needed for Cough.  Dispense: 30 capsule; Refill: 0  -     albuterol sulfate HFA (Ventolin HFA) 108 (90 Base) MCG/ACT inhaler; Inhale 2 puffs Every 4 (Four) Hours As Needed for Wheezing or Shortness of Air.  Dispense: 18 g; Refill: 0  -     POCT SARS-CoV-2 Antigen ROSELINE  -     POCT rapid strep A    2. Psychophysiological insomnia  -     hydrOXYzine (ATARAX) 25 MG tablet; Take 1 tablet by mouth At Night As Needed for Anxiety (sleep).  Dispense: 30 tablet; Refill: 2      1. Fatigue  - The patient will return for a physical exam.  Needs further work-up.    2.  Bronchitis  -Flu and COVID, strep all negative.  Likely viral versus allergic reaction.  - Benzonatate (Tessalon Perles) 100 MG capsule  - Albuterol sulfate HFA (Ventolin HFA) 108 (90 Base) MCG/ACT inhaler    3.  Insomnia  -I have given her hydroxyzine with discussion of side effects and cautions.       Follow Up   Return if symptoms worsen or fail to improve, for Annual physical.  Patient was given instructions and counseling regarding her condition or for health maintenance advice. Please see specific information pulled into the AVS if appropriate.       Answers for HPI/ROS submitted by the patient on 2/12/2023  Please describe your symptoms.: Tired all time, achy joints, brain fog, insomnia, no motivation, always worn out.  Have you had these symptoms before?: Yes  How long have you been having these symptoms?: Greater than 2 weeks  Please list any medications you are currently taking for this condition.: None, vitamins  Please describe any probable cause for these symptoms. : Not sure  What is the primary reason for your visit?: Other            Transcribed from ambient dictation for KISHOR Arana by Julianna Rees.  02/14/23   14:46 EST    Patient or patient representative verbalized consent to the visit  recording.  I have personally performed the services described in this document as transcribed by the above individual, and it is both accurate and complete.

## 2023-03-14 ENCOUNTER — OFFICE VISIT (OUTPATIENT)
Dept: FAMILY MEDICINE CLINIC | Facility: CLINIC | Age: 53
End: 2023-03-14
Payer: COMMERCIAL

## 2023-03-14 VITALS
HEART RATE: 61 BPM | BODY MASS INDEX: 35.51 KG/M2 | WEIGHT: 200.4 LBS | DIASTOLIC BLOOD PRESSURE: 80 MMHG | HEIGHT: 63 IN | OXYGEN SATURATION: 99 % | TEMPERATURE: 97.1 F | SYSTOLIC BLOOD PRESSURE: 133 MMHG

## 2023-03-14 DIAGNOSIS — Z00.00 WELLNESS EXAMINATION: Primary | ICD-10-CM

## 2023-03-14 DIAGNOSIS — R41.89 BRAIN FOG: ICD-10-CM

## 2023-03-14 DIAGNOSIS — F51.04 CHRONIC INSOMNIA: ICD-10-CM

## 2023-03-14 PROCEDURE — 1160F RVW MEDS BY RX/DR IN RCRD: CPT | Performed by: NURSE PRACTITIONER

## 2023-03-14 PROCEDURE — 1159F MED LIST DOCD IN RCRD: CPT | Performed by: NURSE PRACTITIONER

## 2023-03-14 PROCEDURE — 99396 PREV VISIT EST AGE 40-64: CPT | Performed by: NURSE PRACTITIONER

## 2023-03-14 NOTE — PROGRESS NOTES
"Chief Complaint  Annual Exam    Subjective        Janey Peña presents to [facility] PRIMARY CARE  History of Present Illness    The patient is a 52-year-old female who presents today for a physical. She consents to the use of BETITO.    When asked about health goals this year she states: \"I want to feel better, I wanna sleep again, I don't wanna ache, and I would like to lose some weight.\" She is tired of being \"fat\", out of energy, and always tired.     She drinks at least 64 ounces of water daily. She watches what she eats. The patient has always struggled with her weight. At one time she was going to a bariatric clinic, following their plan, and was going to the gym every day, and she lost weight. She got frustrated and quit going. She loses 5 to 10 pounds and then gains it back. The patient consumes about 1000 to 1200 calories a day. She tries to eat at least 60 grams of protein, and not more than 20 carbs. She does not have an appetite, but if she does not eat, she gets a migraine and nausea. Phentermine did not work for her. She was getting a vitamin B12 injection weekly when she was going to the bariatric clinic.  This did seem to improve her energy.  She reports that bariatrics told her they were not able to identify why she cannot lose weight.    The patient has battled her sleep for decades.  Able to fall asleep but not stay asleep.  When she wakes up at 2:30 AM, she can hardly keep her eyes open, but she has to get out of bed because she is jittery, shaky, and agitated. She estimates she gets 4 to 5 hours of sleep.   She states she has never been happier in her life and does not think any of her problems related to depression. However, if she did not have to work, she probably would not leave her house because she can not stand people right now. She does not have the energy to be out doing anything.   The patient has started taking vitamins. She complains of brain fog for about the last 6 " months. The patient tried Focus Factor gummies, but they did not help. She felt better when she was taking the B12 shots. They also helped with her aches and pains mostly both hips and shoulders.    She vapes THC, 4 to 5 puffs at night to relax, and takes a THC gummy before she goes to bed. It helps her to fall asleep. She does not stay asleep. If she gets up in the middle of the night, she will take another THC gummy and then she will go back to sleep. It does not affect her waking up or cause morning grogginess.     She does not have any new aches or pains. She wakes up in the middle of the night with throbbing in her hips and shoulders. She has to constantly flip and flop. Sometimes she will take ibuprofen when she wakes up at 2:30 AM. After taking it she goes back to bed after 1 to 1.5 hours. Sometimes she can not go back to sleep. She started taking turmeric, fritz, and magnesium together about 2 weeks ago.      She does not know if she has sleep apnea. Her mother went through sleep issues all her life and was on Ambien forever. Her father has been to special clinics in [place] due to insomnia.    The patient saw her ENT today. Her vertigo has returned, and they did some exercises for it. She has a follow-up appointment in 2 weeks. They will perform an ultrasound of her thyroid to watch something found there. It was not done today due to vertigo.    She does not take the COVID-19 booster. She does not know if she is up to date with her tetanus vaccine. She plans to get the shingles vaccine.    She has an appointment on 05/01/2023 with the gynecologist.    She did the Cologuard on 12/30/2021.    She has a cyst left anterior lower forearm that has been present for a couple of years. She would like it looked at.  Not painful and not changing.    She does not smoke cigarettes. She smoked Delta 8 marijuana a couple of times.    The patient can not take multivitamins-makes her nauseous.    The Review of Systems  "has been reviewed and is negative other than what has been discussed within the HPI.    Objective   Vital Signs:  /80   Pulse 61   Temp 97.1 °F (36.2 °C) (Temporal)   Ht 160 cm (63\")   Wt 90.9 kg (200 lb 6.4 oz)   SpO2 99%   BMI 35.50 kg/m²   Estimated body mass index is 35.5 kg/m² as calculated from the following:    Height as of this encounter: 160 cm (63\").    Weight as of this encounter: 90.9 kg (200 lb 6.4 oz).       Class 2 Severe Obesity (BMI >=35 and <=39.9). Obesity-related health conditions include the following: osteoarthritis. Obesity is unchanged. BMI is is above average; BMI management plan is completed. We discussed portion control and increasing exercise.      Physical Exam  Vitals and nursing note reviewed.   Constitutional:       General: She is not in acute distress.     Appearance: She is well-developed. She is not ill-appearing.   HENT:      Head: Normocephalic and atraumatic.      Right Ear: Ear canal and external ear normal. A middle ear effusion is present.      Left Ear: Ear canal and external ear normal. A middle ear effusion is present.      Mouth/Throat:      Mouth: Mucous membranes are moist.      Pharynx: Uvula midline. No posterior oropharyngeal erythema.   Eyes:      General: No scleral icterus.        Right eye: No discharge.         Left eye: No discharge.      Conjunctiva/sclera: Conjunctivae normal.   Neck:      Thyroid: No thyromegaly.   Cardiovascular:      Rate and Rhythm: Normal rate and regular rhythm.      Heart sounds: Normal heart sounds. No murmur heard.  Pulmonary:      Effort: Pulmonary effort is normal.      Breath sounds: Normal breath sounds.   Abdominal:      General: Bowel sounds are normal.      Palpations: Abdomen is soft.      Tenderness: There is no abdominal tenderness.   Musculoskeletal:         General: No deformity.      Cervical back: Neck supple.      Comments: Gait smooth and steady   Lymphadenopathy:      Cervical: No cervical adenopathy. "   Skin:     General: Skin is warm and dry.   Neurological:      General: No focal deficit present.      Mental Status: She is alert and oriented to person, place, and time.   Psychiatric:         Mood and Affect: Mood normal.         Behavior: Behavior normal.         Thought Content: Thought content normal.        Result Review :                   Assessment and Plan   Diagnoses and all orders for this visit:    1. Wellness examination (Primary)  -     Vitamin B12  -     CBC & Differential  -     Comprehensive Metabolic Panel  -     Hemoglobin A1c  -     Lipid Panel With LDL / HDL Ratio  -     TSH Rfx On Abnormal To Free T4    2. Chronic insomnia  -     Ambulatory Referral to Sleep Medicine    3. Brain fog      Appropriate health maintenance and prevention topics specific for this patient were discussed today.  Additionally, health goals, and health concerns addressed as appropriate.  Pt was encouraged to stay up to date on recommended screenings and vaccines based on USPSTF guidelines.         Chronic insomnia: Based etiologies discussed including DEANGELO, periodic limb movement disorder, RLS.  Referral to Sleep Medicine made.     Brain fog  She was encouraged to take a B complex vitamin. We will check her vitamin B12 level. If her level is low, we will prescribe home injections if she would like.    Weight management discussed in depth:  She was encouraged to look at her protein and carbs for a month.  Recommend at least 70 to 100 g of protein per day and carbs less than 130 g/day, adequate hydration.  CBC with differential, CMP, HbA1c, lipid panel, and TSH ordered.  SANIA-7 Score: SANIA 7 Total Score: 7  PHQ-9 Total Score: PHQ-9: Brief Depression Severity Measure Score: 17           Follow Up   Return if symptoms worsen or fail to improve.  Patient was given instructions and counseling regarding her condition or for health maintenance advice. Please see specific information pulled into the AVS if appropriate.      Transcribed from ambient dictation for [employer] by Kiki Baez.  03/14/23   15:30 EDT    Patient or patient representative verbalized consent to the visit recording.  I have personally performed the services described in this document as transcribed by the above individual, and it is both accurate and complete.

## 2023-03-15 DIAGNOSIS — R41.89 BRAIN FOG: Primary | ICD-10-CM

## 2023-03-15 LAB
ALBUMIN SERPL-MCNC: 4.3 G/DL (ref 3.5–5.2)
ALBUMIN/GLOB SERPL: 1.9 G/DL
ALP SERPL-CCNC: 58 U/L (ref 39–117)
ALT SERPL-CCNC: 17 U/L (ref 1–33)
AST SERPL-CCNC: 12 U/L (ref 1–32)
BASOPHILS # BLD AUTO: 0.12 10*3/MM3 (ref 0–0.2)
BASOPHILS NFR BLD AUTO: 1.7 % (ref 0–1.5)
BILIRUB SERPL-MCNC: 0.5 MG/DL (ref 0–1.2)
BUN SERPL-MCNC: 10 MG/DL (ref 6–20)
BUN/CREAT SERPL: 9.9 (ref 7–25)
CALCIUM SERPL-MCNC: 9.9 MG/DL (ref 8.6–10.5)
CHLORIDE SERPL-SCNC: 108 MMOL/L (ref 98–107)
CHOLEST SERPL-MCNC: 153 MG/DL (ref 0–200)
CO2 SERPL-SCNC: 28.1 MMOL/L (ref 22–29)
CREAT SERPL-MCNC: 1.01 MG/DL (ref 0.57–1)
EGFRCR SERPLBLD CKD-EPI 2021: 66.3 ML/MIN/1.73
EOSINOPHIL # BLD AUTO: 0.27 10*3/MM3 (ref 0–0.4)
EOSINOPHIL NFR BLD AUTO: 3.9 % (ref 0.3–6.2)
ERYTHROCYTE [DISTWIDTH] IN BLOOD BY AUTOMATED COUNT: 12.4 % (ref 12.3–15.4)
GLOBULIN SER CALC-MCNC: 2.3 GM/DL
GLUCOSE SERPL-MCNC: 101 MG/DL (ref 65–99)
HBA1C MFR BLD: 5 % (ref 4.8–5.6)
HCT VFR BLD AUTO: 41.5 % (ref 34–46.6)
HDLC SERPL-MCNC: 55 MG/DL (ref 40–60)
HGB BLD-MCNC: 14 G/DL (ref 12–15.9)
IMM GRANULOCYTES # BLD AUTO: 0.01 10*3/MM3 (ref 0–0.05)
IMM GRANULOCYTES NFR BLD AUTO: 0.1 % (ref 0–0.5)
LDLC SERPL CALC-MCNC: 82 MG/DL (ref 0–100)
LDLC/HDLC SERPL: 1.48 {RATIO}
LYMPHOCYTES # BLD AUTO: 1.77 10*3/MM3 (ref 0.7–3.1)
LYMPHOCYTES NFR BLD AUTO: 25.3 % (ref 19.6–45.3)
MCH RBC QN AUTO: 29.2 PG (ref 26.6–33)
MCHC RBC AUTO-ENTMCNC: 33.7 G/DL (ref 31.5–35.7)
MCV RBC AUTO: 86.6 FL (ref 79–97)
MONOCYTES # BLD AUTO: 0.61 10*3/MM3 (ref 0.1–0.9)
MONOCYTES NFR BLD AUTO: 8.7 % (ref 5–12)
NEUTROPHILS # BLD AUTO: 4.22 10*3/MM3 (ref 1.7–7)
NEUTROPHILS NFR BLD AUTO: 60.3 % (ref 42.7–76)
NRBC BLD AUTO-RTO: 0 /100 WBC (ref 0–0.2)
PLATELET # BLD AUTO: 268 10*3/MM3 (ref 140–450)
POTASSIUM SERPL-SCNC: 4.6 MMOL/L (ref 3.5–5.2)
PROT SERPL-MCNC: 6.6 G/DL (ref 6–8.5)
RBC # BLD AUTO: 4.79 10*6/MM3 (ref 3.77–5.28)
SODIUM SERPL-SCNC: 144 MMOL/L (ref 136–145)
TRIGL SERPL-MCNC: 83 MG/DL (ref 0–150)
TSH SERPL DL<=0.005 MIU/L-ACNC: 1.96 UIU/ML (ref 0.27–4.2)
VIT B12 SERPL-MCNC: 535 PG/ML (ref 211–946)
VLDLC SERPL CALC-MCNC: 16 MG/DL (ref 5–40)
WBC # BLD AUTO: 7 10*3/MM3 (ref 3.4–10.8)

## 2023-05-02 ENCOUNTER — OFFICE VISIT (OUTPATIENT)
Dept: OBSTETRICS AND GYNECOLOGY | Facility: CLINIC | Age: 53
End: 2023-05-02
Payer: COMMERCIAL

## 2023-05-02 VITALS
SYSTOLIC BLOOD PRESSURE: 132 MMHG | BODY MASS INDEX: 35.58 KG/M2 | HEIGHT: 63 IN | WEIGHT: 200.8 LBS | DIASTOLIC BLOOD PRESSURE: 84 MMHG

## 2023-05-02 DIAGNOSIS — R68.82 DECREASED LIBIDO: ICD-10-CM

## 2023-05-02 DIAGNOSIS — N95.1 MENOPAUSAL SYMPTOMS: Primary | ICD-10-CM

## 2023-05-02 RX ORDER — PROGESTERONE 100 MG/1
100 CAPSULE ORAL DAILY
Qty: 30 CAPSULE | Refills: 1 | Status: SHIPPED | OUTPATIENT
Start: 2023-05-02

## 2023-05-02 NOTE — PROGRESS NOTES
"Subjective     Chief Complaint   Patient presents with   • Follow-up     Hot flashes, hormonal issues       Janey Peña is a 52 y.o.  whose LMP is No LMP recorded. Patient has had a hysterectomy. She is a new patient to me. She is S/P partial hysterectomy d/t endometriosis and adenomyosis at 30. SHe has one ovary remaining. She is asking for her hormones to be checked. She currently has c/o night sweats, hot flashes, brain fog, and aches of her hips. Reports this is affecting her quality of life.     She also c/o decreased sex drive. She reports her relationship is being negatively impacted with the decreased sex drive. She denies sex is not painful. She denies relationship issues. Her meds have been rev'd.     Denies personal h/o cancer, DVT or PE.     HPI    HPI    The following portions of the patient's history were reviewed and updated as appropriate:vital signs, allergies, current medications, past medical history, past social history, past surgical history and problem list      Review of Systems     Review of Systems   Constitutional: Positive for activity change and fatigue.   Endocrine: Positive for heat intolerance.   Genitourinary: Positive for decreased libido.   Musculoskeletal: Positive for arthralgias.   Psychiatric/Behavioral: Positive for behavioral problems and decreased concentration.       Objective      /84   Ht 160 cm (63\")   Wt 91.1 kg (200 lb 12.8 oz)   BMI 35.57 kg/m²     Physical Exam    Physical Exam  Vitals and nursing note reviewed.   Constitutional:       Appearance: Normal appearance.   Musculoskeletal:         General: Normal range of motion.   Skin:     General: Skin is warm and dry.   Neurological:      General: No focal deficit present.      Mental Status: She is alert and oriented to person, place, and time.   Psychiatric:         Mood and Affect: Mood normal.         Behavior: Behavior normal.         Lab Review   Labs: NA    Imaging   No data " reviewed    Assessment  Diagnoses and all orders for this visit:    1. Pap smear, low-risk (Primary)  -     Cancel: IGP, Apt HPV,rfx 16 / 18,45    2. Routine gynecological examination  -     POC Urinalysis Dipstick  -     Cancel: IGP, Apt HPV,rfx 16 / 18,45    3. Special screening examination for human papillomavirus (HPV)  -     Cancel: IGP, Apt HPV,rfx 16 / 18,45        Assessment/Plan     Menopausal symptoms- Plan to check labs: Thyroid panel, TPO, JORGE, FSH, estradiol. Patient counseled that hormone treatment should be used to help with specific symptoms related to menopause such as hot flashes, night sweats and vaginal atrophy. Hormones should not be given for “general wellbeing” or to avoid aging. The lowest dose hormone that treats symptoms should be used for the shortest about of time possible.  Although estrogen is the most effective treatment for hot flashes, other non hormonal options exist (such as Brisdelle or venlafaxine) and should also be considered.  Anyone with an intact uterus should also receive progestogen to prevent uterine overgrowth that can lead to uterine cancer.  All hormone therapy, whether it is synthetic or bio identical, can lead to increased risk of stroke, heart attack and thromboembolic diseases.  These adverse events are more likely to develop in the first year of use and in patients who are older than the typical menopausal age.  Risks of estrogen dependent cancers such as breast cancer increase with prolonged use.  Attempts to wean hormone therapy should be discussed annually and particularly after three to five years of use.  Any side effects such as vaginal bleeding or pain should be reported immediately. ERX Prometrium.   Decreased sex drive- Denies pain with sex or relationship issues. Declined ref to sex therapist. Med list rev'd. Info provided on decreased sex drive and ways to treat.     RTO 1 month to disc labs     Estela Henao, KISHOR  5/2/2023

## 2023-05-03 ENCOUNTER — PATIENT ROUNDING (BHMG ONLY) (OUTPATIENT)
Dept: OBSTETRICS AND GYNECOLOGY | Facility: CLINIC | Age: 53
End: 2023-05-03
Payer: COMMERCIAL

## 2023-05-03 NOTE — PROGRESS NOTES
A MY-CHART MESSAGE HAS BEEN SENT TO THE PATIENT FOR [place] [facility] [place]

## 2023-05-04 LAB
25(OH)D3+25(OH)D2 SERPL-MCNC: 24.2 NG/ML (ref 30–100)
ANA SER QL: NEGATIVE
ESTRADIOL SERPL-MCNC: 23.3 PG/ML
FSH SERPL-ACNC: 54.5 MIU/ML
T3 SERPL-MCNC: 122 NG/DL (ref 71–180)
T4 FREE SERPL-MCNC: 1.16 NG/DL (ref 0.82–1.77)
TESTOST FREE SERPL-MCNC: 0.6 PG/ML (ref 0–4.2)
THYROPEROXIDASE AB SERPL-ACNC: <9 IU/ML (ref 0–34)

## 2023-05-05 RX ORDER — ERGOCALCIFEROL 1.25 MG/1
50000 CAPSULE ORAL WEEKLY
Qty: 8 CAPSULE | Refills: 0 | Status: SHIPPED | OUTPATIENT
Start: 2023-05-05

## 2023-05-30 ENCOUNTER — OFFICE VISIT (OUTPATIENT)
Dept: OBSTETRICS AND GYNECOLOGY | Facility: CLINIC | Age: 53
End: 2023-05-30
Payer: COMMERCIAL

## 2023-05-30 DIAGNOSIS — Z79.899 FOLLOW-UP ENCOUNTER INVOLVING MEDICATION: Primary | ICD-10-CM

## 2023-05-30 PROCEDURE — 1160F RVW MEDS BY RX/DR IN RCRD: CPT | Performed by: NURSE PRACTITIONER

## 2023-05-30 PROCEDURE — 1159F MED LIST DOCD IN RCRD: CPT | Performed by: NURSE PRACTITIONER

## 2023-05-30 PROCEDURE — 99441 PR PHYS/QHP TELEPHONE EVALUATION 5-10 MIN: CPT | Performed by: NURSE PRACTITIONER

## 2023-05-30 RX ORDER — PROGESTERONE 100 MG/1
100 CAPSULE ORAL DAILY
Qty: 30 CAPSULE | Refills: 6 | Status: SHIPPED | OUTPATIENT
Start: 2023-05-30

## 2023-05-30 NOTE — PROGRESS NOTES
You have chosen to receive care through a telephone visit. Do you consent to use a telephone visit for your medical care today? Yes    S: She was started on Prometrium 100mg q HS for c/o hot flashes, night sweats, brain fog, decreased sleep and decreased sex drive. She was started on Prometrium 100mg capsules. She denies seeing any improvement in her symptoms. Her labs in 5/2/23 indicated menopause. She is S/P hysterectomy d/t adenomyosis and endometriosis. She is a non smoker. She denies personal history of DVT/PE. Her mother has a history of DVT but had a very complicated medical history. She has not been on contraception since being in her early 20s. She reports being on a pill. She was recently started on Nurtec d/t migraines, followed by neurology.  Denies migraine with aura.     O: general- alert and oriented x 3    A/P: Menopausal symptoms-Disc options of adding estrogen vs SSRI. She is going to consider her options. Patient counseled that hormone treatment should be used to help with specific symptoms related to menopause such as hot flashes, night sweats and vaginal atrophy. Hormones should not be given for “general wellbeing” or to avoid aging. The lowest dose hormone that treats symptoms should be used for the shortest about of time possible.  Although estrogen is the most effective treatment for hot flashes, other non hormonal options exist (such as Brisdelle or venlafaxine) and should also be considered.  Anyone with an intact uterus should also receive progestogen to prevent uterine overgrowth that can lead to uterine cancer.  All hormone therapy, whether it is synthetic or bio identical, can lead to increased risk of stroke, heart attack and thromboembolic diseases.  These adverse events are more likely to develop in the first year of use and in patients who are older than the typical menopausal age.  Risks of estrogen dependent cancers such as breast cancer increase with prolonged use.  Attempts to  wean hormone therapy should be discussed annually and particularly after three to five years of use.  Any side effects such as vaginal bleeding or pain should be reported immediately.  2) Family h/o DVT- Pt's mother with DVT, unknown if d/t complex medical history of underlying cause such as Factor V. Pt's mother . Check thrombophilia panel.       Estela Henao, KISHOR  16:39 EDT   23    Telephone call lasted approx 10 minutes.

## 2023-06-05 PROBLEM — Z79.899 FOLLOW-UP ENCOUNTER INVOLVING MEDICATION: Status: ACTIVE | Noted: 2023-06-05

## 2023-06-14 RX ORDER — ESTRADIOL 1 MG/1
1 TABLET ORAL DAILY
Qty: 30 TABLET | Refills: 11 | Status: SHIPPED | OUTPATIENT
Start: 2023-06-14

## 2023-06-19 RX ORDER — ERGOCALCIFEROL 1.25 MG/1
50000 CAPSULE ORAL WEEKLY
Qty: 8 CAPSULE | Refills: 0 | Status: SHIPPED | OUTPATIENT
Start: 2023-06-19

## 2023-08-08 DIAGNOSIS — J40 BRONCHITIS: ICD-10-CM

## 2023-08-09 RX ORDER — PROGESTERONE 100 MG/1
100 CAPSULE ORAL DAILY
Qty: 30 CAPSULE | Refills: 6 | Status: SHIPPED | OUTPATIENT
Start: 2023-08-09

## 2023-08-10 RX ORDER — ALBUTEROL SULFATE 90 UG/1
2 AEROSOL, METERED RESPIRATORY (INHALATION) EVERY 4 HOURS PRN
Qty: 18 G | Refills: 2 | Status: SHIPPED | OUTPATIENT
Start: 2023-08-10

## 2023-08-10 NOTE — TELEPHONE ENCOUNTER
Rx Refill Note  Requested Prescriptions     Pending Prescriptions Disp Refills    albuterol sulfate HFA (Ventolin HFA) 108 (90 Base) MCG/ACT inhaler 18 g 0     Sig: Inhale 2 puffs Every 4 (Four) Hours As Needed for Wheezing or Shortness of Air.      Last office visit with prescribing clinician: 3/14/2023   Last telemedicine visit with prescribing clinician: Visit date not found   Next office visit with prescribing clinician: Visit date not found                         Would you like a call back once the refill request has been completed: [] Yes [] No    If the office needs to give you a call back, can they leave a voicemail: [] Yes [] No    Kaye Porter MA  08/10/23, 07:28 EDT

## 2023-08-15 RX ORDER — ESTRADIOL 1 MG/1
1 TABLET ORAL DAILY
Qty: 30 TABLET | Refills: 1 | Status: SHIPPED | OUTPATIENT
Start: 2023-08-15

## 2023-08-28 RX ORDER — ERGOCALCIFEROL 1.25 MG/1
CAPSULE ORAL
Qty: 8 CAPSULE | Refills: 0 | Status: SHIPPED | OUTPATIENT
Start: 2023-08-28

## 2023-08-28 RX ORDER — NORTRIPTYLINE HYDROCHLORIDE 10 MG/1
CAPSULE ORAL
COMMUNITY
Start: 2023-06-29

## 2023-08-28 RX ORDER — RIMEGEPANT SULFATE 75 MG/75MG
TABLET, ORALLY DISINTEGRATING ORAL
COMMUNITY
Start: 2023-07-05

## 2023-09-08 RX ORDER — ESTRADIOL 1 MG/1
TABLET ORAL
Qty: 30 TABLET | Refills: 1 | Status: SHIPPED | OUTPATIENT
Start: 2023-09-08

## 2023-10-03 DIAGNOSIS — J40 BRONCHITIS: ICD-10-CM

## 2023-10-03 RX ORDER — ALBUTEROL SULFATE 90 UG/1
2 AEROSOL, METERED RESPIRATORY (INHALATION) EVERY 4 HOURS PRN
Qty: 18 G | Refills: 2 | Status: SHIPPED | OUTPATIENT
Start: 2023-10-03

## 2023-10-03 NOTE — TELEPHONE ENCOUNTER
Rx Refill Note  Requested Prescriptions     Pending Prescriptions Disp Refills    albuterol sulfate HFA (Ventolin HFA) 108 (90 Base) MCG/ACT inhaler 18 g 2     Sig: Inhale 2 puffs Every 4 (Four) Hours As Needed for Wheezing or Shortness of Air.      Last office visit with prescribing clinician: 3/14/2023   Last telemedicine visit with prescribing clinician: Visit date not found   Next office visit with prescribing clinician: Visit date not found                         Would you like a call back once the refill request has been completed: [] Yes [] No    If the office needs to give you a call back, can they leave a voicemail: [] Yes [] No    Shaun Tyler MA  10/03/23, 13:31 EDT

## 2023-10-04 RX ORDER — ESTRADIOL 1 MG/1
1 TABLET ORAL DAILY
Qty: 30 TABLET | Refills: 1 | Status: SHIPPED | OUTPATIENT
Start: 2023-10-04

## 2023-10-04 RX ORDER — ERGOCALCIFEROL 1.25 MG/1
50000 CAPSULE ORAL WEEKLY
Qty: 8 CAPSULE | Refills: 0 | Status: SHIPPED | OUTPATIENT
Start: 2023-10-04

## 2023-10-04 RX ORDER — PROGESTERONE 100 MG/1
100 CAPSULE ORAL DAILY
Qty: 30 CAPSULE | Refills: 6 | Status: SHIPPED | OUTPATIENT
Start: 2023-10-04

## 2023-10-23 RX ORDER — ONDANSETRON 4 MG/1
4 TABLET, ORALLY DISINTEGRATING ORAL AS NEEDED
OUTPATIENT
Start: 2023-10-23

## 2023-10-23 NOTE — TELEPHONE ENCOUNTER
Rx Refill Note  Requested Prescriptions     Pending Prescriptions Disp Refills    ondansetron ODT (ZOFRAN-ODT) 4 MG disintegrating tablet       Si tablet As Needed.      Last office visit with prescribing clinician: 3/14/2023   Last telemedicine visit with prescribing clinician: Visit date not found   Next office visit with prescribing clinician: Visit date not found                         Would you like a call back once the refill request has been completed: [] Yes [] No    If the office needs to give you a call back, can they leave a voicemail: [] Yes [] No    Shaun Tyler MA  10/23/23, 13:36 EDT

## 2023-11-13 DIAGNOSIS — F51.04 PSYCHOPHYSIOLOGICAL INSOMNIA: ICD-10-CM

## 2023-11-14 RX ORDER — ERGOCALCIFEROL 1.25 MG/1
50000 CAPSULE ORAL WEEKLY
Qty: 8 CAPSULE | Refills: 0 | Status: SHIPPED | OUTPATIENT
Start: 2023-11-14

## 2023-11-14 RX ORDER — ESTRADIOL 1 MG/1
1 TABLET ORAL DAILY
Qty: 30 TABLET | Refills: 1 | Status: SHIPPED | OUTPATIENT
Start: 2023-11-14

## 2023-11-14 RX ORDER — PROGESTERONE 100 MG/1
100 CAPSULE ORAL DAILY
Qty: 30 CAPSULE | Refills: 6 | Status: SHIPPED | OUTPATIENT
Start: 2023-11-14

## 2023-11-14 NOTE — TELEPHONE ENCOUNTER
Rx Refill Note  Requested Prescriptions     Pending Prescriptions Disp Refills    hydrOXYzine (ATARAX) 25 MG tablet 30 tablet 2     Sig: Take 1 tablet by mouth At Night As Needed for Anxiety (sleep).      Last office visit with prescribing clinician: 3/14/2023   Last telemedicine visit with prescribing clinician: Visit date not found   Next office visit with prescribing clinician: Visit date not found                         Would you like a call back once the refill request has been completed: [] Yes [] No    If the office needs to give you a call back, can they leave a voicemail: [] Yes [] No    Kaye Porter MA  11/14/23, 07:28 EST

## 2023-11-16 RX ORDER — HYDROXYZINE HYDROCHLORIDE 25 MG/1
25 TABLET, FILM COATED ORAL NIGHTLY PRN
Qty: 30 TABLET | Refills: 2 | Status: SHIPPED | OUTPATIENT
Start: 2023-11-16

## 2023-12-29 DIAGNOSIS — J40 BRONCHITIS: ICD-10-CM

## 2023-12-29 DIAGNOSIS — F51.04 PSYCHOPHYSIOLOGICAL INSOMNIA: ICD-10-CM

## 2023-12-29 DIAGNOSIS — M25.512 ACUTE PAIN OF LEFT SHOULDER: ICD-10-CM

## 2024-01-02 RX ORDER — HYDROXYZINE HYDROCHLORIDE 25 MG/1
25 TABLET, FILM COATED ORAL NIGHTLY PRN
Qty: 30 TABLET | Refills: 2 | Status: SHIPPED | OUTPATIENT
Start: 2024-01-02

## 2024-01-02 RX ORDER — ALBUTEROL SULFATE 90 UG/1
2 AEROSOL, METERED RESPIRATORY (INHALATION) EVERY 4 HOURS PRN
Qty: 18 G | Refills: 2 | Status: SHIPPED | OUTPATIENT
Start: 2024-01-02

## 2024-01-02 RX ORDER — IBUPROFEN 200 MG
600 TABLET ORAL EVERY 4 HOURS
OUTPATIENT
Start: 2024-01-02

## 2024-01-02 NOTE — TELEPHONE ENCOUNTER
Rx Refill Note  Requested Prescriptions     Pending Prescriptions Disp Refills    albuterol sulfate HFA (Ventolin HFA) 108 (90 Base) MCG/ACT inhaler 18 g 2     Sig: Inhale 2 puffs Every 4 (Four) Hours As Needed for Wheezing or Shortness of Air.      Last office visit with prescribing clinician: 3/14/2023   Last telemedicine visit with prescribing clinician: Visit date not found   Next office visit with prescribing clinician: 12/29/2023                         Would you like a call back once the refill request has been completed: [] Yes [] No    If the office needs to give you a call back, can they leave a voicemail: [] Yes [] No    Shaun Tyler MA  01/02/24, 08:38 EST

## 2024-01-02 NOTE — TELEPHONE ENCOUNTER
Rx Refill Note  Requested Prescriptions     Pending Prescriptions Disp Refills    ibuprofen (ADVIL,MOTRIN) 200 MG tablet       Sig: Take 3 tablets by mouth Every 4 (Four) Hours.      Last office visit with prescribing clinician: 3/14/2023   Last telemedicine visit with prescribing clinician: Visit date not found   Next office visit with prescribing clinician: Visit date not found                         Would you like a call back once the refill request has been completed: [] Yes [] No    If the office needs to give you a call back, can they leave a voicemail: [] Yes [] No    Shaun Tyler MA  01/02/24, 08:39 EST

## 2024-01-02 NOTE — TELEPHONE ENCOUNTER
Rx Refill Note  Requested Prescriptions     Pending Prescriptions Disp Refills    hydrOXYzine (ATARAX) 25 MG tablet 30 tablet 2     Sig: Take 1 tablet by mouth At Night As Needed for Anxiety (sleep).      Last office visit with prescribing clinician: 3/14/2023   Last telemedicine visit with prescribing clinician: Visit date not found   Next office visit with prescribing clinician: 12/29/2023                         Would you like a call back once the refill request has been completed: [] Yes [] No    If the office needs to give you a call back, can they leave a voicemail: [] Yes [] No    Shaun Tyler MA  01/02/24, 08:38 EST

## 2024-01-03 RX ORDER — ESTRADIOL 1 MG/1
1 TABLET ORAL DAILY
Qty: 30 TABLET | Refills: 1 | Status: SHIPPED | OUTPATIENT
Start: 2024-01-03

## 2024-01-03 RX ORDER — ERGOCALCIFEROL 1.25 MG/1
50000 CAPSULE ORAL WEEKLY
Qty: 8 CAPSULE | Refills: 0 | Status: SHIPPED | OUTPATIENT
Start: 2024-01-03

## 2024-02-04 DIAGNOSIS — F51.04 PSYCHOPHYSIOLOGICAL INSOMNIA: ICD-10-CM

## 2024-02-05 RX ORDER — PROGESTERONE 100 MG/1
100 CAPSULE ORAL DAILY
Qty: 30 CAPSULE | Refills: 6 | Status: SHIPPED | OUTPATIENT
Start: 2024-02-05

## 2024-02-05 RX ORDER — HYDROXYZINE HYDROCHLORIDE 25 MG/1
25 TABLET, FILM COATED ORAL NIGHTLY PRN
Qty: 30 TABLET | Refills: 1 | Status: SHIPPED | OUTPATIENT
Start: 2024-02-05

## 2024-02-05 RX ORDER — ERGOCALCIFEROL 1.25 MG/1
50000 CAPSULE ORAL WEEKLY
Qty: 8 CAPSULE | Refills: 0 | Status: SHIPPED | OUTPATIENT
Start: 2024-02-05

## 2024-02-05 RX ORDER — ESTRADIOL 1 MG/1
1 TABLET ORAL DAILY
Qty: 30 TABLET | Refills: 1 | Status: SHIPPED | OUTPATIENT
Start: 2024-02-05

## 2024-02-05 NOTE — TELEPHONE ENCOUNTER
Called and spoke w/Pt, rafa that she doesn't have any insurance at this time and doesn't want an astronomical bill so she is unable to schedule an OV Appt to be seen by Raulito.

## 2024-03-04 RX ORDER — ESTRADIOL 1 MG/1
1 TABLET ORAL DAILY
Qty: 30 TABLET | Refills: 1 | Status: SHIPPED | OUTPATIENT
Start: 2024-03-04

## 2024-03-04 RX ORDER — PROGESTERONE 100 MG/1
100 CAPSULE ORAL DAILY
Qty: 30 CAPSULE | Refills: 6 | Status: SHIPPED | OUTPATIENT
Start: 2024-03-04

## 2024-03-04 RX ORDER — ERGOCALCIFEROL 1.25 MG/1
50000 CAPSULE ORAL WEEKLY
Qty: 8 CAPSULE | Refills: 0 | Status: SHIPPED | OUTPATIENT
Start: 2024-03-04

## 2024-04-11 DIAGNOSIS — F51.04 PSYCHOPHYSIOLOGICAL INSOMNIA: ICD-10-CM

## 2024-04-11 RX ORDER — HYDROXYZINE HYDROCHLORIDE 25 MG/1
TABLET, FILM COATED ORAL
Qty: 90 TABLET | Refills: 1 | OUTPATIENT
Start: 2024-04-11

## 2024-04-11 NOTE — TELEPHONE ENCOUNTER
Rx Refill Note  Requested Prescriptions     Pending Prescriptions Disp Refills    hydrOXYzine (ATARAX) 25 MG tablet [Pharmacy Med Name: hydroxyzine HCl 25 mg tablet] 90 tablet 1     Sig: TAKE 1 TABLET BY MOUTH EVERY NIGHT AT BEDTIME AS NEEDED FOR ANXIETY / SLEEP      Last office visit with prescribing clinician: 3/14/2023   Last telemedicine visit with prescribing clinician: Visit date not found   Next office visit with prescribing clinician: Visit date not found                         Would you like a call back once the refill request has been completed: [] Yes [] No    If the office needs to give you a call back, can they leave a voicemail: [] Yes [] No    Shaun Tyler MA  04/11/24, 08:15 EDT

## 2024-04-16 NOTE — TELEPHONE ENCOUNTER
Called and spoke w/Pt, rafa that she won't be making an Appt due to she doesn't have any insurance.

## 2024-04-24 RX ORDER — ESTRADIOL 1 MG/1
1 TABLET ORAL DAILY
Qty: 30 TABLET | Refills: 1 | Status: SHIPPED | OUTPATIENT
Start: 2024-04-24

## 2024-04-24 RX ORDER — ERGOCALCIFEROL 1.25 MG/1
50000 CAPSULE ORAL WEEKLY
Qty: 8 CAPSULE | Refills: 0 | Status: SHIPPED | OUTPATIENT
Start: 2024-04-24

## 2024-04-24 RX ORDER — PROGESTERONE 100 MG/1
100 CAPSULE ORAL DAILY
Qty: 30 CAPSULE | Refills: 6 | Status: SHIPPED | OUTPATIENT
Start: 2024-04-24

## 2024-06-10 RX ORDER — ERGOCALCIFEROL 1.25 MG/1
50000 CAPSULE ORAL WEEKLY
Qty: 8 CAPSULE | Refills: 0 | Status: SHIPPED | OUTPATIENT
Start: 2024-06-10

## 2024-06-10 RX ORDER — ESTRADIOL 1 MG/1
1 TABLET ORAL DAILY
Qty: 30 TABLET | Refills: 1 | Status: SHIPPED | OUTPATIENT
Start: 2024-06-10

## 2024-06-10 RX ORDER — PROGESTERONE 100 MG/1
100 CAPSULE ORAL DAILY
Qty: 30 CAPSULE | Refills: 6 | Status: SHIPPED | OUTPATIENT
Start: 2024-06-10

## 2024-07-15 RX ORDER — ERGOCALCIFEROL 1.25 MG/1
50000 CAPSULE ORAL WEEKLY
Qty: 8 CAPSULE | Refills: 0 | Status: SHIPPED | OUTPATIENT
Start: 2024-07-15

## 2024-07-15 RX ORDER — ESTRADIOL 1 MG/1
1 TABLET ORAL DAILY
Qty: 30 TABLET | Refills: 1 | Status: SHIPPED | OUTPATIENT
Start: 2024-07-15

## 2024-07-15 RX ORDER — PROGESTERONE 100 MG/1
100 CAPSULE ORAL DAILY
Qty: 30 CAPSULE | Refills: 6 | Status: SHIPPED | OUTPATIENT
Start: 2024-07-15

## 2024-08-09 DIAGNOSIS — F51.04 PSYCHOPHYSIOLOGICAL INSOMNIA: ICD-10-CM

## 2024-08-12 RX ORDER — ERGOCALCIFEROL 1.25 MG/1
50000 CAPSULE ORAL WEEKLY
Qty: 8 CAPSULE | Refills: 0 | Status: SHIPPED | OUTPATIENT
Start: 2024-08-12

## 2024-08-12 RX ORDER — ESTRADIOL 1 MG/1
1 TABLET ORAL DAILY
Qty: 30 TABLET | Refills: 1 | Status: SHIPPED | OUTPATIENT
Start: 2024-08-12

## 2024-08-12 RX ORDER — PROGESTERONE 100 MG/1
100 CAPSULE ORAL DAILY
Qty: 30 CAPSULE | Refills: 6 | Status: SHIPPED | OUTPATIENT
Start: 2024-08-12

## 2024-08-12 NOTE — TELEPHONE ENCOUNTER
Rx Refill Note  Requested Prescriptions     Pending Prescriptions Disp Refills    hydrOXYzine (ATARAX) 25 MG tablet 30 tablet 1     Sig: Take 1 tablet by mouth At Night As Needed for Anxiety (sleep).      Last office visit with prescribing clinician: 3/14/2023   Last telemedicine visit with prescribing clinician: Visit date not found   Next office visit with prescribing clinician: Visit date not found                         Would you like a call back once the refill request has been completed: [] Yes [] No    If the office needs to give you a call back, can they leave a voicemail: [] Yes [] No    Mary Raphael  08/12/24, 11:44 EDT

## 2024-08-13 RX ORDER — HYDROXYZINE HYDROCHLORIDE 25 MG/1
25 TABLET, FILM COATED ORAL NIGHTLY PRN
Qty: 30 TABLET | Refills: 1 | OUTPATIENT
Start: 2024-08-13

## 2024-09-27 RX ORDER — ESTRADIOL 1 MG/1
1 TABLET ORAL DAILY
Qty: 30 TABLET | Refills: 1 | Status: SHIPPED | OUTPATIENT
Start: 2024-09-27

## 2024-09-27 RX ORDER — ERGOCALCIFEROL 1.25 MG/1
50000 CAPSULE, LIQUID FILLED ORAL WEEKLY
Qty: 8 CAPSULE | Refills: 0 | Status: SHIPPED | OUTPATIENT
Start: 2024-09-27

## 2024-09-27 RX ORDER — PROGESTERONE 100 MG/1
100 CAPSULE ORAL DAILY
Qty: 30 CAPSULE | Refills: 6 | Status: SHIPPED | OUTPATIENT
Start: 2024-09-27

## 2024-11-01 RX ORDER — ERGOCALCIFEROL 1.25 MG/1
50000 CAPSULE, LIQUID FILLED ORAL WEEKLY
Qty: 8 CAPSULE | Refills: 0 | Status: SHIPPED | OUTPATIENT
Start: 2024-11-01

## 2024-11-01 RX ORDER — PROGESTERONE 100 MG/1
100 CAPSULE ORAL DAILY
Qty: 30 CAPSULE | Refills: 6 | Status: SHIPPED | OUTPATIENT
Start: 2024-11-01

## 2024-11-01 RX ORDER — ESTRADIOL 1 MG/1
1 TABLET ORAL DAILY
Qty: 30 TABLET | Refills: 1 | Status: SHIPPED | OUTPATIENT
Start: 2024-11-01

## 2024-12-02 RX ORDER — PROGESTERONE 100 MG/1
100 CAPSULE ORAL DAILY
Qty: 30 CAPSULE | Refills: 6 | OUTPATIENT
Start: 2024-12-02

## 2024-12-02 RX ORDER — ESTRADIOL 1 MG/1
1 TABLET ORAL DAILY
Qty: 30 TABLET | Refills: 1 | OUTPATIENT
Start: 2024-12-02

## 2024-12-02 RX ORDER — ERGOCALCIFEROL 1.25 MG/1
50000 CAPSULE, LIQUID FILLED ORAL WEEKLY
Qty: 8 CAPSULE | Refills: 0 | OUTPATIENT
Start: 2024-12-02

## 2024-12-20 RX ORDER — ESTRADIOL 1 MG/1
1 TABLET ORAL DAILY
Qty: 30 TABLET | Refills: 1 | Status: SHIPPED | OUTPATIENT
Start: 2024-12-20

## 2024-12-30 RX ORDER — ESTRADIOL 1 MG/1
1 TABLET ORAL DAILY
Qty: 30 TABLET | Refills: 1 | Status: SHIPPED | OUTPATIENT
Start: 2024-12-30

## 2024-12-30 RX ORDER — ERGOCALCIFEROL 1.25 MG/1
50000 CAPSULE, LIQUID FILLED ORAL WEEKLY
Qty: 8 CAPSULE | Refills: 0 | Status: SHIPPED | OUTPATIENT
Start: 2024-12-30

## 2024-12-30 RX ORDER — PROGESTERONE 100 MG/1
100 CAPSULE ORAL DAILY
Qty: 30 CAPSULE | Refills: 6 | Status: SHIPPED | OUTPATIENT
Start: 2024-12-30

## 2025-01-31 RX ORDER — ESTRADIOL 1 MG/1
1 TABLET ORAL DAILY
Qty: 30 TABLET | Refills: 1 | OUTPATIENT
Start: 2025-01-31

## 2025-01-31 RX ORDER — PROGESTERONE 100 MG/1
100 CAPSULE ORAL DAILY
Qty: 30 CAPSULE | Refills: 6 | OUTPATIENT
Start: 2025-01-31

## 2025-01-31 RX ORDER — ERGOCALCIFEROL 1.25 MG/1
50000 CAPSULE, LIQUID FILLED ORAL WEEKLY
Qty: 8 CAPSULE | Refills: 0 | OUTPATIENT
Start: 2025-01-31